# Patient Record
Sex: FEMALE | Race: WHITE | NOT HISPANIC OR LATINO
[De-identification: names, ages, dates, MRNs, and addresses within clinical notes are randomized per-mention and may not be internally consistent; named-entity substitution may affect disease eponyms.]

---

## 2022-12-01 ENCOUNTER — APPOINTMENT (OUTPATIENT)
Dept: OBGYN | Facility: CLINIC | Age: 67
End: 2022-12-01

## 2023-01-09 ENCOUNTER — APPOINTMENT (OUTPATIENT)
Dept: OBGYN | Facility: CLINIC | Age: 68
End: 2023-01-09
Payer: MEDICARE

## 2023-01-09 VITALS
DIASTOLIC BLOOD PRESSURE: 86 MMHG | BODY MASS INDEX: 32.07 KG/M2 | SYSTOLIC BLOOD PRESSURE: 145 MMHG | WEIGHT: 181 LBS | HEIGHT: 63 IN

## 2023-01-09 DIAGNOSIS — Z01.419 ENCOUNTER FOR GYNECOLOGICAL EXAMINATION (GENERAL) (ROUTINE) W/OUT ABNORMAL FINDINGS: ICD-10-CM

## 2023-01-09 DIAGNOSIS — N95.2 POSTMENOPAUSAL ATROPHIC VAGINITIS: ICD-10-CM

## 2023-01-09 PROCEDURE — G0328 FECAL BLOOD SCRN IMMUNOASSAY: CPT | Mod: QW

## 2023-01-09 PROCEDURE — G0101: CPT

## 2023-01-10 LAB — HPV HIGH+LOW RISK DNA PNL CVX: NOT DETECTED

## 2023-01-16 LAB — CYTOLOGY CVX/VAG DOC THIN PREP: ABNORMAL

## 2023-02-09 DIAGNOSIS — R92.8 OTHER ABNORMAL AND INCONCLUSIVE FINDINGS ON DIAGNOSTIC IMAGING OF BREAST: ICD-10-CM

## 2023-02-13 ENCOUNTER — NON-APPOINTMENT (OUTPATIENT)
Age: 68
End: 2023-02-13

## 2023-02-14 DIAGNOSIS — N63.0 UNSPECIFIED LUMP IN UNSPECIFIED BREAST: ICD-10-CM

## 2023-02-23 ENCOUNTER — NON-APPOINTMENT (OUTPATIENT)
Age: 68
End: 2023-02-23

## 2023-02-24 ENCOUNTER — NON-APPOINTMENT (OUTPATIENT)
Age: 68
End: 2023-02-24

## 2023-02-28 ENCOUNTER — NON-APPOINTMENT (OUTPATIENT)
Age: 68
End: 2023-02-28

## 2023-03-07 ENCOUNTER — APPOINTMENT (OUTPATIENT)
Dept: SURGICAL ONCOLOGY | Facility: CLINIC | Age: 68
End: 2023-03-07

## 2023-03-09 ENCOUNTER — OUTPATIENT (OUTPATIENT)
Dept: OUTPATIENT SERVICES | Facility: HOSPITAL | Age: 68
LOS: 1 days | End: 2023-03-09
Payer: MEDICARE

## 2023-03-09 ENCOUNTER — APPOINTMENT (OUTPATIENT)
Dept: MRI IMAGING | Facility: CLINIC | Age: 68
End: 2023-03-09
Payer: MEDICARE

## 2023-03-09 DIAGNOSIS — C50.912 MALIGNANT NEOPLASM OF UNSPECIFIED SITE OF LEFT FEMALE BREAST: ICD-10-CM

## 2023-03-09 PROCEDURE — C8908: CPT

## 2023-03-09 PROCEDURE — 77049 MRI BREAST C-+ W/CAD BI: CPT | Mod: 26

## 2023-03-09 PROCEDURE — A9585: CPT

## 2023-03-09 PROCEDURE — C8937: CPT

## 2023-03-10 ENCOUNTER — NON-APPOINTMENT (OUTPATIENT)
Age: 68
End: 2023-03-10

## 2023-03-10 ENCOUNTER — APPOINTMENT (OUTPATIENT)
Dept: SURGICAL ONCOLOGY | Facility: CLINIC | Age: 68
End: 2023-03-10
Payer: MEDICARE

## 2023-03-10 VITALS
HEIGHT: 63 IN | WEIGHT: 177 LBS | BODY MASS INDEX: 31.36 KG/M2 | RESPIRATION RATE: 15 BRPM | OXYGEN SATURATION: 96 % | SYSTOLIC BLOOD PRESSURE: 144 MMHG | HEART RATE: 75 BPM | DIASTOLIC BLOOD PRESSURE: 92 MMHG

## 2023-03-10 PROCEDURE — 99205 OFFICE O/P NEW HI 60 MIN: CPT

## 2023-03-21 ENCOUNTER — OUTPATIENT (OUTPATIENT)
Dept: OUTPATIENT SERVICES | Facility: HOSPITAL | Age: 68
LOS: 1 days | End: 2023-03-21
Payer: MEDICARE

## 2023-03-21 ENCOUNTER — RESULT REVIEW (OUTPATIENT)
Age: 68
End: 2023-03-21

## 2023-03-21 DIAGNOSIS — Z90.49 ACQUIRED ABSENCE OF OTHER SPECIFIED PARTS OF DIGESTIVE TRACT: Chronic | ICD-10-CM

## 2023-03-21 DIAGNOSIS — C80.1 MALIGNANT (PRIMARY) NEOPLASM, UNSPECIFIED: ICD-10-CM

## 2023-03-21 PROCEDURE — 88321 CONSLTJ&REPRT SLD PREP ELSWR: CPT

## 2023-03-22 LAB — SURGICAL PATHOLOGY STUDY: SIGNIFICANT CHANGE UP

## 2023-03-23 ENCOUNTER — RESULT REVIEW (OUTPATIENT)
Age: 68
End: 2023-03-23

## 2023-03-23 ENCOUNTER — APPOINTMENT (OUTPATIENT)
Dept: MAMMOGRAPHY | Facility: CLINIC | Age: 68
End: 2023-03-23
Payer: MEDICARE

## 2023-03-23 ENCOUNTER — APPOINTMENT (OUTPATIENT)
Dept: ULTRASOUND IMAGING | Facility: CLINIC | Age: 68
End: 2023-03-23
Payer: MEDICARE

## 2023-03-23 ENCOUNTER — APPOINTMENT (OUTPATIENT)
Dept: CT IMAGING | Facility: CLINIC | Age: 68
End: 2023-03-23
Payer: MEDICARE

## 2023-03-23 ENCOUNTER — OUTPATIENT (OUTPATIENT)
Dept: OUTPATIENT SERVICES | Facility: HOSPITAL | Age: 68
LOS: 1 days | End: 2023-03-23
Payer: MEDICARE

## 2023-03-23 DIAGNOSIS — R92.8 OTHER ABNORMAL AND INCONCLUSIVE FINDINGS ON DIAGNOSTIC IMAGING OF BREAST: ICD-10-CM

## 2023-03-23 DIAGNOSIS — Z90.49 ACQUIRED ABSENCE OF OTHER SPECIFIED PARTS OF DIGESTIVE TRACT: Chronic | ICD-10-CM

## 2023-03-23 PROCEDURE — 71250 CT THORAX DX C-: CPT | Mod: 26

## 2023-03-23 PROCEDURE — 76641 ULTRASOUND BREAST COMPLETE: CPT | Mod: 26,LT

## 2023-03-23 PROCEDURE — 71250 CT THORAX DX C-: CPT

## 2023-03-23 PROCEDURE — G0279: CPT | Mod: 26

## 2023-03-23 PROCEDURE — 77065 DX MAMMO INCL CAD UNI: CPT | Mod: 26,LT

## 2023-03-23 PROCEDURE — 77065 DX MAMMO INCL CAD UNI: CPT

## 2023-03-23 PROCEDURE — 76641 ULTRASOUND BREAST COMPLETE: CPT

## 2023-03-23 PROCEDURE — G0279: CPT

## 2023-03-24 ENCOUNTER — OUTPATIENT (OUTPATIENT)
Dept: OUTPATIENT SERVICES | Facility: HOSPITAL | Age: 68
LOS: 1 days | End: 2023-03-24
Payer: MEDICARE

## 2023-03-24 VITALS
RESPIRATION RATE: 15 BRPM | HEART RATE: 84 BPM | DIASTOLIC BLOOD PRESSURE: 84 MMHG | WEIGHT: 177.91 LBS | TEMPERATURE: 98 F | OXYGEN SATURATION: 99 % | HEIGHT: 60 IN | SYSTOLIC BLOOD PRESSURE: 120 MMHG

## 2023-03-24 DIAGNOSIS — C50.919 MALIGNANT NEOPLASM OF UNSPECIFIED SITE OF UNSPECIFIED FEMALE BREAST: ICD-10-CM

## 2023-03-24 DIAGNOSIS — R09.89 OTHER SPECIFIED SYMPTOMS AND SIGNS INVOLVING THE CIRCULATORY AND RESPIRATORY SYSTEMS: ICD-10-CM

## 2023-03-24 DIAGNOSIS — C50.912 MALIGNANT NEOPLASM OF UNSPECIFIED SITE OF LEFT FEMALE BREAST: ICD-10-CM

## 2023-03-24 DIAGNOSIS — Z90.49 ACQUIRED ABSENCE OF OTHER SPECIFIED PARTS OF DIGESTIVE TRACT: Chronic | ICD-10-CM

## 2023-03-24 LAB
ALBUMIN SERPL ELPH-MCNC: 4.3 G/DL — SIGNIFICANT CHANGE UP (ref 3.3–5)
ALP SERPL-CCNC: 58 U/L — SIGNIFICANT CHANGE UP (ref 40–120)
ALT FLD-CCNC: 15 U/L — SIGNIFICANT CHANGE UP (ref 4–33)
ANION GAP SERPL CALC-SCNC: 10 MMOL/L — SIGNIFICANT CHANGE UP (ref 7–14)
AST SERPL-CCNC: 18 U/L — SIGNIFICANT CHANGE UP (ref 4–32)
BILIRUB SERPL-MCNC: 0.2 MG/DL — SIGNIFICANT CHANGE UP (ref 0.2–1.2)
BUN SERPL-MCNC: 20 MG/DL — SIGNIFICANT CHANGE UP (ref 7–23)
CALCIUM SERPL-MCNC: 9.7 MG/DL — SIGNIFICANT CHANGE UP (ref 8.4–10.5)
CHLORIDE SERPL-SCNC: 105 MMOL/L — SIGNIFICANT CHANGE UP (ref 98–107)
CO2 SERPL-SCNC: 25 MMOL/L — SIGNIFICANT CHANGE UP (ref 22–31)
CREAT SERPL-MCNC: 0.9 MG/DL — SIGNIFICANT CHANGE UP (ref 0.5–1.3)
EGFR: 69 ML/MIN/1.73M2 — SIGNIFICANT CHANGE UP
GLUCOSE SERPL-MCNC: 113 MG/DL — HIGH (ref 70–99)
HCT VFR BLD CALC: 43.4 % — SIGNIFICANT CHANGE UP (ref 34.5–45)
HGB BLD-MCNC: 13.9 G/DL — SIGNIFICANT CHANGE UP (ref 11.5–15.5)
MCHC RBC-ENTMCNC: 28.2 PG — SIGNIFICANT CHANGE UP (ref 27–34)
MCHC RBC-ENTMCNC: 32 GM/DL — SIGNIFICANT CHANGE UP (ref 32–36)
MCV RBC AUTO: 88 FL — SIGNIFICANT CHANGE UP (ref 80–100)
NRBC # BLD: 0 /100 WBCS — SIGNIFICANT CHANGE UP (ref 0–0)
NRBC # FLD: 0 K/UL — SIGNIFICANT CHANGE UP (ref 0–0)
PLATELET # BLD AUTO: 202 K/UL — SIGNIFICANT CHANGE UP (ref 150–400)
POTASSIUM SERPL-MCNC: 4.5 MMOL/L — SIGNIFICANT CHANGE UP (ref 3.5–5.3)
POTASSIUM SERPL-SCNC: 4.5 MMOL/L — SIGNIFICANT CHANGE UP (ref 3.5–5.3)
PROT SERPL-MCNC: 6.9 G/DL — SIGNIFICANT CHANGE UP (ref 6–8.3)
RBC # BLD: 4.93 M/UL — SIGNIFICANT CHANGE UP (ref 3.8–5.2)
RBC # FLD: 12.3 % — SIGNIFICANT CHANGE UP (ref 10.3–14.5)
SODIUM SERPL-SCNC: 140 MMOL/L — SIGNIFICANT CHANGE UP (ref 135–145)
WBC # BLD: 6.16 K/UL — SIGNIFICANT CHANGE UP (ref 3.8–10.5)
WBC # FLD AUTO: 6.16 K/UL — SIGNIFICANT CHANGE UP (ref 3.8–10.5)

## 2023-03-24 PROCEDURE — 93010 ELECTROCARDIOGRAM REPORT: CPT

## 2023-03-24 NOTE — H&P PST ADULT - MOUTH
Follow up with your pediatrician within 48 hours of discharge.    Return to ED for worsening abdominal pain, fever, bloody urine, or inability to urinate. moist

## 2023-03-24 NOTE — H&P PST ADULT - HISTORY OF PRESENT ILLNESS
67 year old female with  PMH of Obesity, presents to [facility], with pre op diagnosis of malignant neoplasm of left female breast, for pre op evaluation prior to scheduled surgery-left breastSavi localized partial mastectomy left axillary sentinel lymph node biopsy possible tissue transfer. with Dr Reyes.

## 2023-03-24 NOTE — H&P PST ADULT - ASSESSMENT
67 year old female with no pertinent PMH, presents to [facility], with pre op diagnosis of malignant neoplasm of left female breast, for pre op evaluation prior to scheduled surgery-left breastSavi localized partial mastectomy left axillary sentinel lymph node biopsy possible tissue transfer. with Dr Reyes.

## 2023-03-24 NOTE — H&P PST ADULT - ATTENDING COMMENTS
Laterality was confirmed with the patient and marked accordingly.   No new changes since H&P.   Risks and benefits of procedure were discussed with the patient, including but not limited to bleeding, infection, altered cosmesis, numbness, and possible need for additional surgery.  Informed consent was obtained.    OK to proceed with scheduled surgery:  Left anahy-localized PM, possible tissue transfer, Left SLNB

## 2023-03-24 NOTE — H&P PST ADULT - NSANTHOSAYNRD_GEN_A_CORE
No. MANDA screening performed.  STOP BANG Legend: 0-2 = LOW Risk; 3-4 = INTERMEDIATE Risk; 5-8 = HIGH Risk

## 2023-03-30 ENCOUNTER — RESULT REVIEW (OUTPATIENT)
Age: 68
End: 2023-03-30

## 2023-03-30 ENCOUNTER — APPOINTMENT (OUTPATIENT)
Dept: ULTRASOUND IMAGING | Facility: IMAGING CENTER | Age: 68
End: 2023-03-30
Payer: MEDICARE

## 2023-03-30 ENCOUNTER — OUTPATIENT (OUTPATIENT)
Dept: OUTPATIENT SERVICES | Facility: HOSPITAL | Age: 68
LOS: 1 days | End: 2023-03-30
Payer: MEDICARE

## 2023-03-30 ENCOUNTER — TRANSCRIPTION ENCOUNTER (OUTPATIENT)
Age: 68
End: 2023-03-30

## 2023-03-30 VITALS
DIASTOLIC BLOOD PRESSURE: 86 MMHG | TEMPERATURE: 98 F | SYSTOLIC BLOOD PRESSURE: 160 MMHG | HEIGHT: 60 IN | WEIGHT: 177.91 LBS | HEART RATE: 66 BPM | OXYGEN SATURATION: 99 % | RESPIRATION RATE: 15 BRPM

## 2023-03-30 DIAGNOSIS — Z90.49 ACQUIRED ABSENCE OF OTHER SPECIFIED PARTS OF DIGESTIVE TRACT: Chronic | ICD-10-CM

## 2023-03-30 DIAGNOSIS — Z00.8 ENCOUNTER FOR OTHER GENERAL EXAMINATION: ICD-10-CM

## 2023-03-30 PROBLEM — M25.562 PAIN IN LEFT KNEE: Chronic | Status: ACTIVE | Noted: 2023-03-24

## 2023-03-30 PROCEDURE — C1739: CPT

## 2023-03-30 PROCEDURE — 19285 PERQ DEV BREAST 1ST US IMAG: CPT | Mod: LT

## 2023-03-30 PROCEDURE — 19285 PERQ DEV BREAST 1ST US IMAG: CPT

## 2023-03-31 ENCOUNTER — APPOINTMENT (OUTPATIENT)
Dept: MAMMOGRAPHY | Facility: IMAGING CENTER | Age: 68
End: 2023-03-31
Payer: MEDICARE

## 2023-03-31 ENCOUNTER — RESULT REVIEW (OUTPATIENT)
Age: 68
End: 2023-03-31

## 2023-03-31 ENCOUNTER — TRANSCRIPTION ENCOUNTER (OUTPATIENT)
Age: 68
End: 2023-03-31

## 2023-03-31 ENCOUNTER — OUTPATIENT (OUTPATIENT)
Dept: OUTPATIENT SERVICES | Facility: HOSPITAL | Age: 68
LOS: 1 days | Discharge: HOME | End: 2023-03-31
Payer: MEDICARE

## 2023-03-31 ENCOUNTER — OUTPATIENT (OUTPATIENT)
Dept: OUTPATIENT SERVICES | Facility: HOSPITAL | Age: 68
LOS: 1 days | End: 2023-03-31
Payer: COMMERCIAL

## 2023-03-31 ENCOUNTER — APPOINTMENT (OUTPATIENT)
Dept: NUCLEAR MEDICINE | Facility: IMAGING CENTER | Age: 68
End: 2023-03-31

## 2023-03-31 ENCOUNTER — APPOINTMENT (OUTPATIENT)
Dept: SURGICAL ONCOLOGY | Facility: AMBULATORY SURGERY CENTER | Age: 68
End: 2023-03-31

## 2023-03-31 VITALS
SYSTOLIC BLOOD PRESSURE: 136 MMHG | DIASTOLIC BLOOD PRESSURE: 81 MMHG | HEART RATE: 77 BPM | RESPIRATION RATE: 16 BRPM | OXYGEN SATURATION: 97 % | TEMPERATURE: 97 F

## 2023-03-31 DIAGNOSIS — Z90.49 ACQUIRED ABSENCE OF OTHER SPECIFIED PARTS OF DIGESTIVE TRACT: Chronic | ICD-10-CM

## 2023-03-31 DIAGNOSIS — C50.912 MALIGNANT NEOPLASM OF UNSPECIFIED SITE OF LEFT FEMALE BREAST: ICD-10-CM

## 2023-03-31 DIAGNOSIS — Z00.8 ENCOUNTER FOR OTHER GENERAL EXAMINATION: ICD-10-CM

## 2023-03-31 PROCEDURE — 38900 IO MAP OF SENT LYMPH NODE: CPT | Mod: LT

## 2023-03-31 PROCEDURE — 76098 X-RAY EXAM SURGICAL SPECIMEN: CPT

## 2023-03-31 PROCEDURE — 88305 TISSUE EXAM BY PATHOLOGIST: CPT | Mod: 26

## 2023-03-31 PROCEDURE — 38792 RA TRACER ID OF SENTINL NODE: CPT | Mod: 59,LT

## 2023-03-31 PROCEDURE — 76098 X-RAY EXAM SURGICAL SPECIMEN: CPT | Mod: 26

## 2023-03-31 PROCEDURE — 38525 BIOPSY/REMOVAL LYMPH NODES: CPT | Mod: LT

## 2023-03-31 PROCEDURE — 19301 PARTIAL MASTECTOMY: CPT | Mod: LT

## 2023-03-31 PROCEDURE — 88307 TISSUE EXAM BY PATHOLOGIST: CPT | Mod: 26

## 2023-03-31 DEVICE — CLIP APPLIER ETHICON LIGACLIP 11.5" MEDIUM: Type: IMPLANTABLE DEVICE | Site: LEFT BREAST | Status: FUNCTIONAL

## 2023-03-31 RX ORDER — SODIUM CHLORIDE 9 MG/ML
1000 INJECTION, SOLUTION INTRAVENOUS
Refills: 0 | Status: DISCONTINUED | OUTPATIENT
Start: 2023-03-31 | End: 2023-04-14

## 2023-03-31 NOTE — ASU DISCHARGE PLAN (ADULT/PEDIATRIC) - CARE PROVIDER_API CALL
Reyes, Sylvia A (MD)  Surgery  [address] [place] [address]  Phone: [phone]  Fax: [phone]  Established Patient  Follow Up Time: 2 weeks

## 2023-03-31 NOTE — ASU DISCHARGE PLAN (ADULT/PEDIATRIC) - NS MD DC FALL RISK RISK
For information on Fall & Injury Prevention, visit: [email or web address] OR  [email or web address] OR  https://www.cdc.gov/steadi/patient.html

## 2023-03-31 NOTE — BRIEF OPERATIVE NOTE - NSICDXBRIEFPROCEDURE_GEN_ALL_CORE_FT
PROCEDURES:  Partial mastectomy of left breast with sentinel lymph node biopsy 31-Mar-2023 09:32:40 Chio localized Ruthie Jimenes  Mapping, lymph node, sentinel, intraoperative 31-Mar-2023 09:33:15 Left axilla Ruthie Jimenes

## 2023-03-31 NOTE — BRIEF OPERATIVE NOTE - OPERATION/FINDINGS
Left breast mass at 1:00 localized with Chio  and removed, Specimen Xray reviewed with no additional margins taken, Clips where placed in cavity. Left axillary [place] lymph node # 1-2 identified (Both Blue) Left breast mass at 1:00 localized with Chio  and removed, Specimen Xray reviewed with no additional margins taken, Clips where placed in cavity. Left axillary [place] lymph node # 1-2 identified (Both Blue).  Posterior margin not to muscle.   Anterior is skin.  No additional margins taken.

## 2023-03-31 NOTE — ASU DISCHARGE PLAN (ADULT/PEDIATRIC) - CALL YOUR DOCTOR IF YOU HAVE ANY OF THE FOLLOWING:
Bleeding that does not stop/Swelling that gets worse/Pain not relieved by Medications/Fever greater than (need to indicate Fahrenheit or Celsius)/Wound/Surgical Site with redness, or foul smelling discharge or pus/Numbness, tingling, color or temperature change to extremity/Increased irritability or sluggishness Bleeding that does not stop/Swelling that gets worse/Pain not relieved by Medications/Fever greater than (need to indicate Fahrenheit or Celsius)/Wound/Surgical Site with redness, or foul smelling discharge or pus/Numbness, tingling, color or temperature change to extremity/Nausea and vomiting that does not stop/Unable to urinate/Inability to tolerate liquids or foods/Increased irritability or sluggishness

## 2023-04-04 LAB — SURGICAL PATHOLOGY STUDY: SIGNIFICANT CHANGE UP

## 2023-04-05 NOTE — PHYSICAL EXAM
[Normal] : supple, no neck mass and thyroid not enlarged [Normal Neck Lymph Nodes] : normal neck lymph nodes  [Normal Supraclavicular Lymph Nodes] : normal supraclavicular lymph nodes [Normal] : oriented to person, place and time, with appropriate affect [de-identified] : Left breast with post-biopsy changes noted in the UOQ, no discrete mass.   No palpable breast masses in either breast.   No skin changes and no nipple discharge on bilateral breast exam.  No gross clinical lymphadenopathy.   The left axilla is faintly palpable for a possible node.    [de-identified] : Normal respiratory effort [de-identified] : No gross clinical axillary lymphadenopathy.   The left axilla is faintly palpable for a possible node.

## 2023-04-05 NOTE — HISTORY OF PRESENT ILLNESS
[de-identified] : REAGAN DIAZ is a 67 year F who presents to office for newly diagnosed Left breast  cancer (1N8 0.4 cm mass) IDC, ER %, [place] %, HER2 Negative (1+) -Non-amplified \par \par Denies feeling any palpable lumps, skin changes or nipple discharge.   No systemic symptoms.   \par \par Referred by: Dr Breonna Ch (GYN) \par PCP: Dr. Chris Shi  \par \par PMHx: Denies \par PSHx: Lap CCY +15 yrs ago, Umbilical hernia + 8 yrs ago \par Meds: Denies \par NKDA\par Family Hx: No history of Breast or ovarian cancer, prostate Cancer ( Father dx 70), Colon Cancer ( son- passed at 35) \par GYN: , menarche age 12.,, menopause in early 50s,  Age at first pregnancy 20.  Y ( 6-7 months of each)\par Bra size:40 D \par

## 2023-04-05 NOTE — ASSESSMENT
[FreeTextEntry1] : REAGAN DIAZ is a 67 year F with L 1N8 0.4 cm IDC, ER/OR Positive, HER2 Negative (1+)- Pending FISH\par ~ cT1 cN0 Stage 1A \par \par We had a lengthy discussion regarding the natural history of breast cancer,her pathology results, her breast imaging, staging, surgical options and adjuvant treatment. \par Please see scanned progress noted for personalized care plan.   \par \par We discussed the sternal mass and she will undergo a CT sternum for further evaluation.   She will also obtain a left axillary ultrasound.   \par \par We discussed surgical options of lumpectomy vs mastectomy and the risks and benefits of each including but not limited to bleeding, infection, altered cosmesis, recovery time, possible need for reoperation, numbness, nerve or vessel injury, and recurrence risk.  She will proceed with breast conservation.  MRI shows no further extent of disease.  \par \par \par Tentative Surgical Plan: Left breast Chio localized partial mastectomy, Left [place] lymph node biopsy, possible tissue transfer \par Tentative Surgical Date:  TBD\par \par - f/u CT chest to evaluate sternal mass, likely hemangioma.  \par - Pre-operative planning, will need appointments for PST, COVID Testing and Chio  localization \par - Will need Medical oncology and Radiation oncology evaluation after surgery \par - RTO for post-operative visit

## 2023-04-05 NOTE — RESULTS/DATA
[FreeTextEntry1] : BREAST PATH/RAD REVIEW \par \par 6/17/2019 BL SC MG: BIRADs 2, Scattered areas of fibroglandular density \par 10/29/2020 BL SC MG: BIRADs 1\par 02/02/2023 BL SC MG: BIRADs 0, L middle UOQ focal asymmetry - Rec L Dx MG/US \par 02/13/2023 L Dx MG/US: BIRADs 4, Indeterminate L 1N8 4 mm suspicious mass - Rec USGBx if non concordant mammographically then rec Stereo bx \par 02/21/2023 L USGBx:\par ~ L 1N8 core bx: Invasive ductal carcinoma with prominent micropapillary feat. Moderately differentiated with calcs, DCIS not seen, ER %, [place] %, HER2 Negative ( 1+)-> FISH PENDING, Ki-67 25%\par \par 3/9/2023 Breast MRI: L UOQ bx marker w/ 8 mm associated NME corresp to site of newly dx malignancy, 2.5 cm Sternal lesion, likely a hemangioma - Rec further eval w/ CT Sternum for confirmation \par

## 2023-04-11 ENCOUNTER — APPOINTMENT (OUTPATIENT)
Dept: SURGICAL ONCOLOGY | Facility: CLINIC | Age: 68
End: 2023-04-11
Payer: MEDICARE

## 2023-04-11 VITALS
TEMPERATURE: 96.9 F | SYSTOLIC BLOOD PRESSURE: 155 MMHG | WEIGHT: 178 LBS | RESPIRATION RATE: 17 BRPM | HEIGHT: 63 IN | BODY MASS INDEX: 31.54 KG/M2 | DIASTOLIC BLOOD PRESSURE: 98 MMHG | HEART RATE: 103 BPM | OXYGEN SATURATION: 98 %

## 2023-04-11 PROCEDURE — 99024 POSTOP FOLLOW-UP VISIT: CPT

## 2023-04-12 ENCOUNTER — NON-APPOINTMENT (OUTPATIENT)
Age: 68
End: 2023-04-12

## 2023-04-17 ENCOUNTER — OUTPATIENT (OUTPATIENT)
Dept: OUTPATIENT SERVICES | Facility: HOSPITAL | Age: 68
LOS: 1 days | Discharge: HOME | End: 2023-04-17
Payer: MEDICARE

## 2023-04-17 DIAGNOSIS — Z90.49 ACQUIRED ABSENCE OF OTHER SPECIFIED PARTS OF DIGESTIVE TRACT: Chronic | ICD-10-CM

## 2023-04-29 ENCOUNTER — OUTPATIENT (OUTPATIENT)
Dept: OUTPATIENT SERVICES | Facility: HOSPITAL | Age: 68
LOS: 1 days | Discharge: HOME | End: 2023-04-29

## 2023-04-29 DIAGNOSIS — N64.89 OTHER SPECIFIED DISORDERS OF BREAST: ICD-10-CM

## 2023-04-29 DIAGNOSIS — Z90.49 ACQUIRED ABSENCE OF OTHER SPECIFIED PARTS OF DIGESTIVE TRACT: Chronic | ICD-10-CM

## 2023-05-01 ENCOUNTER — APPOINTMENT (OUTPATIENT)
Dept: RADIATION ONCOLOGY | Facility: CLINIC | Age: 68
End: 2023-05-01
Payer: MEDICARE

## 2023-05-01 VITALS
BODY MASS INDEX: 31.64 KG/M2 | WEIGHT: 178.57 LBS | OXYGEN SATURATION: 97 % | DIASTOLIC BLOOD PRESSURE: 94 MMHG | HEART RATE: 95 BPM | SYSTOLIC BLOOD PRESSURE: 136 MMHG | TEMPERATURE: 97.7 F | RESPIRATION RATE: 17 BRPM | HEIGHT: 63 IN

## 2023-05-01 DIAGNOSIS — Z78.9 OTHER SPECIFIED HEALTH STATUS: ICD-10-CM

## 2023-05-01 DIAGNOSIS — Z80.42 FAMILY HISTORY OF MALIGNANT NEOPLASM OF PROSTATE: ICD-10-CM

## 2023-05-01 PROCEDURE — 77263 THER RADIOLOGY TX PLNG CPLX: CPT

## 2023-05-01 PROCEDURE — 99204 OFFICE O/P NEW MOD 45 MIN: CPT | Mod: GC,25

## 2023-05-01 RX ORDER — ESTRADIOL 0.1 MG/G
0.1 CREAM VAGINAL
Qty: 1 | Refills: 4 | Status: DISCONTINUED | COMMUNITY
Start: 2023-01-09 | End: 2023-05-01

## 2023-05-01 NOTE — VITALS
[Maximal Pain Intensity: 0/10] : 0/10 [90: Able to carry normal activity; minor signs or symptoms of disease.] : 90: Able to carry normal activity; minor signs or symptoms of disease.  [6 - Distress Level] : Distress Level: 6 [Referred Patient  to social work for follow-up] : Patient was referred to social work for follow-up

## 2023-05-02 ENCOUNTER — NON-APPOINTMENT (OUTPATIENT)
Age: 68
End: 2023-05-02

## 2023-05-03 NOTE — PHYSICAL EXAM
[Sclera] : the sclera and conjunctiva were normal [PERRL With Normal Accommodation] : pupils were equal in size, round, reactive to light [] : no respiratory distress [Exaggerated Use Of Accessory Muscles For Inspiration] : no accessory muscle use [No UE Edema] : there is no upper extremity edema [Abdomen Soft] : soft [Nondistended] : nondistended [Axillary Lymph Nodes Enlarged Bilaterally] : axillary [Normal] : no focal deficits [de-identified] : Left lumpectomy and axillary scars are well healed, no erythema

## 2023-05-03 NOTE — HISTORY OF PRESENT ILLNESS
[FreeTextEntry1] : Ms. Lopez is a 68 yo female with left IDC, ER+W[place]+HER2-, s/p lumpectomy with negative lymph node biopsy. She presents for radiotherapy recommendations.\par \par She was undergoing routine screening mammography. The last screening was on 10/29/20. A screening mammogram on 2/2/23 showed a focal asymmetry was seen in the upper outer left breast, middle depth. Left diagnostic mammography on 2/13/23 showed a persistent indeterminate mass at 1:00, 8 cm FN measuring 3 mm with adjacent distortion. Targeted left breast ultrasound showed an indeterminate 4 mm mass correlating with the mammographic finding. \par \par Ultrasound guided biopsy of the left breast mass on 2/21/23 showed invasive ductal carcinoma with prominent micropapillary features, moderately differentiated, with calcifications. The tumor measured at least 3.5 mm. DCIS was not identified. IHC showed ER %, [place] % and HER 2 1+, KI-67 25%.\par \par Breast MRI on 3/9/23 showed an 8 mm enhancing mass in the upper outer left breast associated with biopsy clip. A sternal lesion consistent with a hemangioma was noted. There was no evidence of multicentric or contralateral disease. There was no axillary or internal mammary lymphadenopathy. \par \par CT chest on 3/23/23 showed the lesion in the mid sternum and unchanged compared to a prior scan from 2016, consistent with hemangioma.\par \par She underwent lumpectomy and sentinel lymph node biopsy on 3/31/23. Pathology showed an invasive moderately differentiated ductal carcinoma measuring 2.0 mm. Extensive DCIS was also present, with solid and cribriform patterns, and intermediate to high grade nuclear atypia. DCIS measured at least 3 mm and was present in 8 out of 19 blocks. One left axillary sentinel lymph node was negative for tumor. Margins were negative for invasive carcinoma and for DCIS by 1 mm. \par \par Today, she feels well with mild tenderness over the incision and operative bed. She denies bleeding, drainage, or fevers.\par

## 2023-05-08 ENCOUNTER — NON-APPOINTMENT (OUTPATIENT)
Age: 68
End: 2023-05-08

## 2023-05-08 ENCOUNTER — APPOINTMENT (OUTPATIENT)
Dept: HEMATOLOGY ONCOLOGY | Facility: CLINIC | Age: 68
End: 2023-05-08
Payer: MEDICARE

## 2023-05-08 VITALS
OXYGEN SATURATION: 97 % | SYSTOLIC BLOOD PRESSURE: 126 MMHG | WEIGHT: 176.37 LBS | TEMPERATURE: 97.2 F | HEART RATE: 96 BPM | RESPIRATION RATE: 18 BRPM | BODY MASS INDEX: 34.17 KG/M2 | DIASTOLIC BLOOD PRESSURE: 92 MMHG | HEIGHT: 60.43 IN

## 2023-05-08 DIAGNOSIS — Z80.0 FAMILY HISTORY OF MALIGNANT NEOPLASM OF DIGESTIVE ORGANS: ICD-10-CM

## 2023-05-08 PROCEDURE — 77333 RADIATION TREATMENT AID(S): CPT | Mod: 26

## 2023-05-08 PROCEDURE — 77290 THER RAD SIMULAJ FIELD CPLX: CPT | Mod: 26

## 2023-05-08 PROCEDURE — 99205 OFFICE O/P NEW HI 60 MIN: CPT

## 2023-05-08 NOTE — HISTORY OF PRESENT ILLNESS
[T: ___] : T[unfilled] [N: ___] : N[unfilled] [M: ___] : M[unfilled] [de-identified] : Patient underwent screening mammogram on 23 which showed focal asymmetry in the left breast upper outer quadrant. Diagnostic mammo/sono on 23 showed indeterminate mass at 1:00 8cmFN measuring 3mm with adjacent distortion on mammo and 4mm on sono.\par \par 23 U/S guided biopsy  - invasive ductal carcinoma with prominent micropapillary features, moderately differentiated, with calcifications. The tumor measured at least 3.5 mm. DCIS was not identified. IHC showed ER %, [place] % and HER 2 1+, KI-67 25%.\par \par 3/9/23 BREAST MRI - 8 mm enhancing mass in the upper outer left breast associated with biopsy clip. A sternal lesion consistent with a hemangioma was noted. There was no evidence of multicentric or contralateral disease. There was no axillary or internal mammary lymphadenopathy. \par \par CT chest on 3/23/23 showed the lesion in the mid sternum and unchanged compared to a prior scan from , consistent with hemangioma.\par \par 3/31/23 Under the care of Dr. Reyes she underwent  lumpectomy and sentinel lymph node biopsy. Pathology - invasive moderately differentiated ductal carcinoma measuring 2.0 mm. Extensive DCIS was also present, with solid and cribriform patterns, and intermediate to high grade nuclear atypia. DCIS measured at least 3 mm and was present in 8 out of 19 blocks. One left axillary sentinel lymph node was negative for tumor. Margins were negative for invasive carcinoma and for DCIS by 1 mm. \par \par On 23 she was seen by Dr. Marion Rice from radiation oncology with plan for radiation planning this week. 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

## 2023-05-08 NOTE — PHYSICAL EXAM
[Fully active, able to carry on all pre-disease performance without restriction] : Status 0 - Fully active, able to carry on all pre-disease performance without restriction [Normal] : affect appropriate [de-identified] : left breast upper outer periareoalar incision well healed; no scar tissue palpated; left axillary LN dissection site well healed; no palpable lesion in either breast [de-identified] : no cervical, supraclav or axillary LAD

## 2023-05-08 NOTE — ASSESSMENT
[FreeTextEntry1] : 66 yo woman with left breast invasive ductal carcinoma measuring 2mm at final pathology (T1aN0), ER+ , TN+, Her2 negative, s/p lumpectomy with Dr. Reyes; also with DCIS noted 1mm from superior margin\par \par - planned for radiation therapy with Dr. Marion Rice; awaiting radiation planning appointment\par - given her cancer was <5mm, no role for chemotherapy; no role to check Oncotype at this time since benefit of chemotherapy in lesions <5mm has not been established\par - after completing radiation therapy, will need to start endocrine therapy to reduce risk of recurrent breast cancer; \par will start with anastrazole 1mg daily about 2 weeks after completing RT\par - risk/benefits/side effects including but not limited to worsening osteoporosis, arthralgia, myalagia, LFT abnormalities and lipid abnormalities discussed\par - will order baseline bone density testing\par - reviewed labs (in [facility]) done on 3/24/23 which showed normal CBC and chem panel including LFTs\par - Patient had the opportunity to have all their questions answered to their satisfaction \par - f/u in 3 months or as needed\par

## 2023-05-08 NOTE — CONSULT LETTER
[Dear  ___] : Dear  [unfilled], [Consult Letter:] : I had the pleasure of evaluating your patient, [unfilled]. [Please see my note below.] : Please see my note below. [Consult Closing:] : Thank you very much for allowing me to participate in the care of this patient.  If you have any questions, please do not hesitate to contact me. [Sincerely,] : Sincerely, [FreeTextEntry3] : Karlie Page MD\par \par Division of Hematology/Oncology\par [facility]\par [address]\[place] [address] \par Tel: [phone]\par Fax: [phone]\par Email: [email or web address]  [DrAmrik  ___] : Dr. CLAY

## 2023-05-15 ENCOUNTER — OUTPATIENT (OUTPATIENT)
Dept: OUTPATIENT SERVICES | Facility: HOSPITAL | Age: 68
LOS: 1 days | Discharge: HOME | End: 2023-05-15

## 2023-05-15 DIAGNOSIS — N64.89 OTHER SPECIFIED DISORDERS OF BREAST: ICD-10-CM

## 2023-05-15 DIAGNOSIS — Z90.49 ACQUIRED ABSENCE OF OTHER SPECIFIED PARTS OF DIGESTIVE TRACT: Chronic | ICD-10-CM

## 2023-05-15 PROCEDURE — 77300 RADIATION THERAPY DOSE PLAN: CPT | Mod: 26

## 2023-05-15 PROCEDURE — 77295 3-D RADIOTHERAPY PLAN: CPT | Mod: 26

## 2023-05-15 PROCEDURE — 77334 RADIATION TREATMENT AID(S): CPT | Mod: 26

## 2023-05-18 PROCEDURE — 77280 THER RAD SIMULAJ FIELD SMPL: CPT | Mod: 26

## 2023-05-24 ENCOUNTER — NON-APPOINTMENT (OUTPATIENT)
Age: 68
End: 2023-05-24

## 2023-05-25 PROCEDURE — 77427 RADIATION TX MANAGEMENT X5: CPT

## 2023-05-26 NOTE — PHYSICAL EXAM
[Normal] : well developed, well nourished, in no acute distress [de-identified] : Left lumpectomy and axillary scars are well healed, no erythema. \par

## 2023-05-26 NOTE — DISEASE MANAGEMENT
[Pathological] : TNM Stage: p [IA] : IA [TTNM] : 1a [NTNM] : 0 [MTNM] : 0 [de-identified] : 1060 cGy [de-identified] : 8758 cGy [de-identified] : Left breast

## 2023-05-26 NOTE — HISTORY OF PRESENT ILLNESS
[FreeTextEntry1] : Ms. Lopez is a 68 yo female with stage IA T1aN0(sn)M0 invasive moderately differentiated ductal carcinoma of the left breast with EIC, ER %, [place] % and HER 2 1+. She underwent lumpectomy with negative margins and had a negative sentinel node biopsy. She is now receiving radiation therapy to the left breast.\par \par She is feeling generally well. She denies fatigue and pain. She has not noted any skin reactions. She is using Aquaphor on the skin. \par

## 2023-05-31 ENCOUNTER — NON-APPOINTMENT (OUTPATIENT)
Age: 68
End: 2023-05-31

## 2023-05-31 NOTE — REVIEW OF SYSTEMS
[Alopecia: Grade 0] : Alopecia: Grade 0 [Pruritus: Grade 0] : Pruritus: Grade 0 [Skin Atrophy: Grade 0] : Skin Atrophy: Grade 0 [Skin Hyperpigmentation: Grade 0] : Skin Hyperpigmentation: Grade 0 [Skin Induration: Grade 0] : Skin Induration: Grade 0 [Fatigue: Grade 0] : Fatigue: Grade 0 [Dermatitis Radiation: Grade 1 - Faint erythema or dry desquamation] : Dermatitis Radiation: Grade 1 - Faint erythema or dry desquamation

## 2023-06-01 NOTE — DISEASE MANAGEMENT
[Pathological] : TNM Stage: p [IA] : IA [TTNM] : 1a [NTNM] : 0 [MTNM] : 0 [de-identified] : 1060 cGy [de-identified] : 1604 cGy [de-identified] : Left breast

## 2023-06-01 NOTE — PHYSICAL EXAM
[Normal] : well developed, well nourished, in no acute distress [de-identified] : Left lumpectomy and axillary scars are well healed, mild erythema. \par

## 2023-06-01 NOTE — HISTORY OF PRESENT ILLNESS
[FreeTextEntry1] : Ms. Lopez is a 68 yo female with stage IA T1aN0(sn)M0 invasive moderately differentiated ductal carcinoma of the left breast with EIC, ER %, [place] % and HER 2 1+. She underwent lumpectomy with negative margins and had a negative sentinel node biopsy. She is now receiving radiation therapy to the left breast.\par \par She is feeling generally well. She denies fatigue and pain. Slight erythema and swelling to left breast.. She is using Aquaphor on the skin. \par

## 2023-06-02 PROCEDURE — 77427 RADIATION TX MANAGEMENT X5: CPT

## 2023-06-06 NOTE — REVIEW OF SYSTEMS
[Fatigue: Grade 0] : Fatigue: Grade 0 [Alopecia: Grade 0] : Alopecia: Grade 0 [Pruritus: Grade 0] : Pruritus: Grade 0 [Skin Atrophy: Grade 0] : Skin Atrophy: Grade 0 [Skin Hyperpigmentation: Grade 0] : Skin Hyperpigmentation: Grade 0 [Skin Induration: Grade 0] : Skin Induration: Grade 0 [Dermatitis Radiation: Grade 1 - Faint erythema or dry desquamation] : Dermatitis Radiation: Grade 1 - Faint erythema or dry desquamation

## 2023-06-07 ENCOUNTER — NON-APPOINTMENT (OUTPATIENT)
Age: 68
End: 2023-06-07

## 2023-06-07 DIAGNOSIS — L58.9 RADIODERMATITIS, UNSPECIFIED: ICD-10-CM

## 2023-06-09 PROCEDURE — 77427 RADIATION TX MANAGEMENT X5: CPT

## 2023-06-12 ENCOUNTER — NON-APPOINTMENT (OUTPATIENT)
Age: 68
End: 2023-06-12

## 2023-06-13 PROBLEM — L58.9 RADIATION DERMATITIS: Status: ACTIVE | Noted: 2023-06-13

## 2023-06-13 NOTE — HISTORY OF PRESENT ILLNESS
[FreeTextEntry1] : Ms. Lopez is a 66 yo female with stage IA T1aN0(sn)M0 invasive moderately differentiated ductal carcinoma of the left breast with EIC, ER %, [place] % and HER 2 1+. She underwent lumpectomy with negative margins and had a negative sentinel node biopsy. She is now receiving radiation therapy to the left breast.\par \par She is feeling generally well. She denies fatigue and pain. Slight erythema and swelling to left breast.. She is using Aquaphor on the skin.  She has a small itchy rash mid chest.\par

## 2023-06-13 NOTE — PHYSICAL EXAM
[Normal] : well developed, well nourished, in no acute distress [de-identified] : Left lumpectomy and axillary scars are well healed, mild erythema left breast and sternum region, rash between breasts and upper inner left chest\par

## 2023-06-14 NOTE — REASON FOR VISIT
[de-identified] : Left Breast Chio Localized partial mastectomy, Left Axillary [place] lymph node biopsy  [de-identified] : 3/31/2023  [de-identified] : 11

## 2023-06-14 NOTE — HISTORY OF PRESENT ILLNESS
[FreeTextEntry1] : REAGAN DIAZ is a 67 yF w Left breast cancer (1N8 0.4 cm mass) IDC, ER %, [place] %, HER2 Negative (1+) -Non-amplified. \par s/p L Chio Loc PM, L SLNBx (0) on 3/31/23 at [facility]. POD 11 \par \par recovering well post-surgery.   pain well controlled.  no fevers/chills.  no complaints. \par \par \par PMHx: Denies \par PSHx: Lap CCY +15 yrs ago, Umbilical hernia + 8 yrs ago \par Meds: Denies \par NKDA\par Family Hx: No history of Breast or ovarian cancer, prostate Cancer ( Father dx 70), Colon Cancer ( son- passed at 35) \par GYN: , menarche age 12., menopause in early 50s, Age at first pregnancy 20.  Y ( 6-7 months of each)\par Bra size:40 D

## 2023-06-14 NOTE — PHYSICAL EXAM
[Normal] : oriented to person, place and time, with appropriate affect [de-identified] : Left breast surgical incision is c/d/i, well-approximated, no hematoma/seroma, no evidence of infection, no parenchymal defect.  Left axillary incision is c/d/i.   [de-identified] : normal respiratory effort

## 2023-06-14 NOTE — DATA REVIEWED
[FreeTextEntry1] : \par BREAST PATH/RAD REVIEW \par \par 6/17/2019 BL SC MG: BIRADs 2, Scattered areas of fibroglandular density \par 10/29/2020 BL SC MG: BIRADs 1\par 02/02/2023 BL SC MG: BIRADs 0, L middle UOQ focal asymmetry - Rec L Dx MG/US \par 02/13/2023 L Dx MG/US: BIRADs 4, Indeterminate L 1N8 4 mm suspicious mass - Rec USGBx if non concordant mammographically then rec Stereo bx \par 02/21/2023 L USGBx:\par ~ L 1N8 core bx: Invasive ductal carcinoma with prominent micropapillary feat. Moderately differentiated with calcs, DCIS not seen, ER %, [place] %, HER2 Negative ( 1+)-> FISH PENDING, Ki-67 25%\par \par 3/9/2023 Breast MRI: L UOQ bx marker w/ 8 mm associated NME corresp to site of newly dx malignancy, 2.5 cm Sternal lesion, likely a hemangioma - Rec further eval w/ CT Sternum for confirmation \par \par 3/23/2023 CT Chest w/o Contrast: Abnormality on Breast MRI corresponds to a sternal abnormality that is unchanged from 12/5/2016, likely hemangioma. \par \par 3/24/2023 L DX MG/US: BIRADs 6, Normal  L Axillary fat-containing LN, L 1N8  bx- proven 5 mm cancer w/ clip in place \par \par ~3/31/2023 s/p L Chio Loc PM, L SLNBx (0/1) at [facility] ~ pT1a, pN0 \par Surgical Path: \par - L Breast 1:00 lumpectomy: Moderately differentiated IDC (2.0 mm), Focus of DCIS 1.0 mm from the superior inked resection margin. It is an isolated focus measuring 2 mm and therefore focal in mature \par \par \par

## 2023-06-14 NOTE — ASSESSMENT
[FreeTextEntry1] : REAGAN DIAZ is a 67 year F with L 1N8 0.4 cm IDC, ER/RI Positive, HER2 Negative (1+), Non amplified cT1 cN0 Stage 1A  s/p L Chio Loc PM, L SLNBx (0/1) on 3/31/23 ~ pT1a, pN0 \par \par 3/10/2023 [facility] Genetic panel: Negative, VUS - NTHL1 \par \par We discussed her surgical Pathology with confirmed Left Cancer - Moderately differentiated IDC  to be 2.0 mm and DCIS w/ isolated focus measuring 2 mm about 1 mm from the superior inked resection margin.  Invasive disease has clear margins and she will not need further surgery.  No lymph node involvement 0/1. She is healing well \par \par \par - Referred to Radiation oncology ( Dr. Rice ) for adjuvant Radiation therapy for focus of DCIS 1 mm from the superior inked resection margin \par - Referred to Medical Oncology ( Dr. Page)  for adjuvant endocrine therapy \par - RTO in 3 months

## 2023-06-19 ENCOUNTER — NON-APPOINTMENT (OUTPATIENT)
Age: 68
End: 2023-06-19

## 2023-06-20 NOTE — DISEASE MANAGEMENT
[Pathological] : TNM Stage: p [IA] : IA [TTNM] : 1a [NTNM] : 0 [MTNM] : 0 [de-identified] : 4268 cGy [de-identified] : 5276 cGy [de-identified] : Left breast

## 2023-06-20 NOTE — PHYSICAL EXAM
[Normal] : well developed, well nourished, in no acute distress [de-identified] : Left lumpectomy and axillary scars are well healed, mild erythema left breast and sternum region, rash between breasts\par

## 2023-06-20 NOTE — REVIEW OF SYSTEMS
[Alopecia: Grade 0] : Alopecia: Grade 0 [Skin Atrophy: Grade 0] : Skin Atrophy: Grade 0 [Skin Induration: Grade 0] : Skin Induration: Grade 0 [Dermatitis Radiation: Grade 1 - Faint erythema or dry desquamation] : Dermatitis Radiation: Grade 1 - Faint erythema or dry desquamation [Pruritus: Grade 1 - Mild or localized; topical intervention indicated] : Pruritus: Grade 1 - Mild or localized; topical intervention indicated [Skin Hyperpigmentation: Grade 1 - Hyperpigmentation covering <10% BSA; no psychosocial impact] : Skin Hyperpigmentation: Grade 1 - Hyperpigmentation covering <10% BSA; no psychosocial impact

## 2023-06-20 NOTE — HISTORY OF PRESENT ILLNESS
[FreeTextEntry1] : Ms. Lopez is a 66 yo female with stage IA T1aN0(sn)M0 invasive moderately differentiated ductal carcinoma of the left breast with EIC, ER %, MS % and HER 2 1+. She underwent lumpectomy with negative margins and had a negative sentinel node biopsy. She is now receiving radiation therapy to the left breast.\par \par She is feeling generally well. She denies fatigue and pain. Mild erythema and swelling to left breast. She is using Aquaphor on the skin.  She has a itchy rash mid chest, still itcy as steroid cream did not help.\par

## 2023-07-03 ENCOUNTER — APPOINTMENT (OUTPATIENT)
Dept: RADIOLOGY | Facility: IMAGING CENTER | Age: 68
End: 2023-07-03
Payer: MEDICARE

## 2023-07-03 ENCOUNTER — OUTPATIENT (OUTPATIENT)
Dept: OUTPATIENT SERVICES | Facility: HOSPITAL | Age: 68
LOS: 1 days | End: 2023-07-03
Payer: MEDICARE

## 2023-07-03 DIAGNOSIS — Z90.49 ACQUIRED ABSENCE OF OTHER SPECIFIED PARTS OF DIGESTIVE TRACT: Chronic | ICD-10-CM

## 2023-07-03 DIAGNOSIS — C50.912 MALIGNANT NEOPLASM OF UNSPECIFIED SITE OF LEFT FEMALE BREAST: ICD-10-CM

## 2023-07-03 PROCEDURE — 77080 DXA BONE DENSITY AXIAL: CPT

## 2023-07-03 PROCEDURE — 77080 DXA BONE DENSITY AXIAL: CPT | Mod: 26

## 2023-07-11 ENCOUNTER — APPOINTMENT (OUTPATIENT)
Dept: SURGICAL ONCOLOGY | Facility: CLINIC | Age: 68
End: 2023-07-11
Payer: MEDICARE

## 2023-07-11 VITALS
OXYGEN SATURATION: 96 % | WEIGHT: 178 LBS | BODY MASS INDEX: 34.95 KG/M2 | HEIGHT: 60 IN | RESPIRATION RATE: 16 BRPM | SYSTOLIC BLOOD PRESSURE: 122 MMHG | DIASTOLIC BLOOD PRESSURE: 86 MMHG | HEART RATE: 96 BPM

## 2023-07-11 PROCEDURE — 99213 OFFICE O/P EST LOW 20 MIN: CPT

## 2023-07-11 NOTE — RESULTS/DATA
[FreeTextEntry1] : \par BREAST PATH/RAD REVIEW \par \par 6/17/2019 BL SC MG: BIRADs 2, Scattered areas of fibroglandular density \par 10/29/2020 BL SC MG: BIRADs 1\par 02/02/2023 BL SC MG: BIRADs 0, L middle UOQ focal asymmetry - Rec L Dx MG/US \par 02/13/2023 L Dx MG/US: BIRADs 4, Indeterminate L 1N8 4 mm suspicious mass - Rec USGBx if non concordant mammographically then rec Stereo bx \par 02/21/2023 L USGBx:\par ~ L 1N8 core bx: Invasive ductal carcinoma with prominent micropapillary feat. Moderately differentiated with calcs, DCIS not seen, ER %, RI %, HER2 Negative ( 1+)-> FISH PENDING, Ki-67 25%\par \par 3/9/2023 Breast MRI: L UOQ bx marker w/ 8 mm associated NME corresp to site of newly dx malignancy, 2.5 cm Sternal lesion, likely a hemangioma - Rec further eval w/ CT Sternum for confirmation \par \par 3/23/2023 CT Chest w/o Contrast: Abnormality on Breast MRI corresponds to a sternal abnormality that is unchanged from 12/5/2016, likely hemangioma. \par \par 3/24/2023 L DX MG/US: BIRADs 6, Normal  L Axillary fat-containing LN, L 1N8  bx- proven 5 mm cancer w/ clip in place \par \par ~3/31/2023 s/p L Chio Loc PM, L SLNBx (0/1) at [facility] ~ pT1a, pN0 \par Surgical Path: \par - L Breast 1:00 lumpectomy: Moderately differentiated IDC (2.0 mm), Focus of DCIS 1.0 mm from the superior inked resection margin. It is an isolated focus measuring 2 mm and therefore focal in nature \par \par \par

## 2023-07-11 NOTE — PHYSICAL EXAM
[Normal] : supple, no neck mass and thyroid not enlarged [Normal Neck Lymph Nodes] : normal neck lymph nodes  [Normal Supraclavicular Lymph Nodes] : normal supraclavicular lymph nodes [Normal Axillary Lymph Nodes] : normal axillary lymph nodes [Normal] : oriented to person, place and time, with appropriate affect [de-identified] : Left breast periareolar incision is well-healed.  no parenchymal defect.  Bilateral breasts with no palpable mass.  Left breast LIQ with post-radiation changes.   Left axillary incision is well-healed.  No clinical lymphadenopathy.  [de-identified] : normal respiratory effort

## 2023-07-11 NOTE — HISTORY OF PRESENT ILLNESS
[FreeTextEntry1] : REAGAN DIAZ is a 68 y/o F w Left breast cT1 cN0  IDC, ER/IA (+) HER2 (-) HER2 Negative (-) s/p L PM, L SLNBx (0/) on 3/31/23 at [facility], s/p RT on adjuvant anastrozole who presents for 3 month follow up.  \par \par 23 Met w/ Dr. Rice for initial eval with plan for radiation therapy to the breast.  \par 23 Met w/ Dr. Page for initial eval, given cancer was <5 mm no role for chemotherapy and no role for oncotype. Plan to start adjuvant anastrozole about 2 weeks after completing RT .  \par 2023- 2023 Completed Left breast RT \par 23 - No new breast complaints.  Left post-RT skin changes are improving.  She started anastrazole 23.   \par \par \par No  PMHx, PSHx: Lap CCY +15 yrs ago, Umbilical hernia + 8 yrs ago \par NKDA\par Family Hx: No history of Breast or ovarian cancer, prostate Cancer ( Father dx 70), Colon Cancer ( son- passed at 35) \par GYN: , menarche age 12., menopause in early 50s, Age at first pregnancy 20.  Y ( 6-7 months of each)\par Bra size:40 D

## 2023-07-11 NOTE — ASSESSMENT
[FreeTextEntry1] : REAGAN DIAZ is a 67 year F with L 1N8 0.4 cm IDC ++-, cT1 cN0 Stage 1A  s/p L Chio Loc PM, L SLNBx (0/1) on 3/31/23 ~ pT1a, pN0. Path w/ 2mm isolated focus of DCIS 1 mm from superior margin.  s/p RT to left breast on adjuvant anastrozole \par 3/10/2023 [facility] Genetic (full) panel: Negative, VUS - NTHL1 \par \par Clinical breast exam is unremarkable. \par - Radiation oncology ( Dr. Rice ): Completed RT on 6/12/23 \par - Medical Oncology ( Dr. Page): started adjuvant anastrozole on 7/1\par - Next imaging: L Dx MG/US September 2023, B/L Dx MG/US due February 2024 \par RTO in 3 months. She knows to return sooner if any breast abnormalities or if any breast issues/concerns arise\par

## 2023-07-27 ENCOUNTER — APPOINTMENT (OUTPATIENT)
Dept: RADIATION ONCOLOGY | Facility: CLINIC | Age: 68
End: 2023-07-27
Payer: MEDICARE

## 2023-07-27 VITALS
DIASTOLIC BLOOD PRESSURE: 86 MMHG | OXYGEN SATURATION: 97 % | SYSTOLIC BLOOD PRESSURE: 119 MMHG | HEIGHT: 60 IN | HEART RATE: 83 BPM | WEIGHT: 179.46 LBS | TEMPERATURE: 97.16 F | BODY MASS INDEX: 35.23 KG/M2

## 2023-07-27 PROCEDURE — 99024 POSTOP FOLLOW-UP VISIT: CPT

## 2023-07-27 RX ORDER — MAGNESIUM OXIDE/MAG AA CHELATE 300 MG
CAPSULE ORAL
Refills: 0 | Status: ACTIVE | COMMUNITY

## 2023-07-27 RX ORDER — CHROMIUM 200 MCG
TABLET ORAL
Refills: 0 | Status: ACTIVE | COMMUNITY

## 2023-07-27 NOTE — VITALS
[90: Able to carry normal activity; minor signs or symptoms of disease.] : 90: Able to carry normal activity; minor signs or symptoms of disease.  [ECOG Performance Status: 0 - Fully active, able to carry on all pre-disease performance without restriction] : Performance Status: 0 - Fully active, able to carry on all pre-disease performance without restriction [Maximal Pain Intensity: 0/10] : 0/10

## 2023-07-28 NOTE — PHYSICAL EXAM
[Sclera] : the sclera and conjunctiva were normal [] : no respiratory distress [Abdomen Soft] : soft [Nondistended] : nondistended [Supraclavicular Lymph Nodes Enlarged Bilaterally] : supraclavicular [Axillary Lymph Nodes Enlarged Bilaterally] : axillary [Normal] : oriented to person, place and time, the affect was normal, the mood was normal and not anxious [de-identified] : Left lumpectomy and axillary scars are well healed, no erythema, mild residual hyperpigmentation.

## 2023-07-28 NOTE — REVIEW OF SYSTEMS
[Negative] : Allergic/Immunologic [Fatigue: Grade 0] : Fatigue: Grade 0 [Skin Hyperpigmentation: Grade 1 - Hyperpigmentation covering <10% BSA; no psychosocial impact] : Skin Hyperpigmentation: Grade 1 - Hyperpigmentation covering <10% BSA; no psychosocial impact [Alopecia: Grade 0] : Alopecia: Grade 0 [Pruritus: Grade 0] : Pruritus: Grade 0 [Skin Atrophy: Grade 0] : Skin Atrophy: Grade 0 [Skin Induration: Grade 0] : Skin Induration: Grade 0 [Dermatitis Radiation: Grade 0] : Dermatitis Radiation: Grade 0

## 2023-07-28 NOTE — HISTORY OF PRESENT ILLNESS
[FreeTextEntry1] : Ms. Lopez is a 66 yo female with stage IA T1aN0(sn)M0 invasive moderately differentiated ductal carcinoma of the left breast with EIC, ER %, DC % and HER 2 1+. She underwent lumpectomy with negative margins and had a negative sentinel node biopsy. She completed a course of adjuvant radiation therapy to the left breast, a dose of 4,240 cGy from 5/19/23 - 6/12/23.\par \par She comes today for a post-treatment evaluation. Reports intermittent tingling/pain in the left pain, not requiring any pain medications. The patient reports feeling generally well.  She is involved in her usual activities.  She has a good appetite and denies dysphagia. She denies cough or shortness of breath. The skin reaction was painful for about 3-4 weeks after treatment. There has been some improvement in the skin during the last 1-2 weeks, no longer painful.  She follows Dr. Page started Anastrazole, which she is tolerating well.\par \par

## 2023-08-08 ENCOUNTER — RX RENEWAL (OUTPATIENT)
Age: 68
End: 2023-08-08

## 2023-08-08 RX ORDER — MOMETASONE FUROATE 1 MG/G
0.1 CREAM TOPICAL TWICE DAILY
Qty: 45 | Refills: 0 | Status: ACTIVE | COMMUNITY
Start: 2023-06-13 | End: 1900-01-01

## 2023-09-07 ENCOUNTER — OUTPATIENT (OUTPATIENT)
Dept: OUTPATIENT SERVICES | Facility: HOSPITAL | Age: 68
LOS: 1 days | Discharge: HOME | End: 2023-09-07

## 2023-09-07 DIAGNOSIS — Z90.49 ACQUIRED ABSENCE OF OTHER SPECIFIED PARTS OF DIGESTIVE TRACT: Chronic | ICD-10-CM

## 2023-09-07 DIAGNOSIS — N64.89 OTHER SPECIFIED DISORDERS OF BREAST: ICD-10-CM

## 2023-09-08 ENCOUNTER — RESULT REVIEW (OUTPATIENT)
Age: 68
End: 2023-09-08

## 2023-09-08 ENCOUNTER — APPOINTMENT (OUTPATIENT)
Dept: HEMATOLOGY ONCOLOGY | Facility: CLINIC | Age: 68
End: 2023-09-08
Payer: MEDICARE

## 2023-09-08 VITALS
DIASTOLIC BLOOD PRESSURE: 86 MMHG | TEMPERATURE: 97.3 F | SYSTOLIC BLOOD PRESSURE: 128 MMHG | OXYGEN SATURATION: 97 % | WEIGHT: 180.11 LBS | RESPIRATION RATE: 16 BRPM | BODY MASS INDEX: 35.18 KG/M2 | HEART RATE: 80 BPM

## 2023-09-08 DIAGNOSIS — Z79.899 OTHER LONG TERM (CURRENT) DRUG THERAPY: ICD-10-CM

## 2023-09-08 DIAGNOSIS — Z78.0 OTHER SPECIFIED DISORDERS OF BONE DENSITY AND STRUCTURE, UNSPECIFIED SITE: ICD-10-CM

## 2023-09-08 DIAGNOSIS — M85.80 OTHER SPECIFIED DISORDERS OF BONE DENSITY AND STRUCTURE, UNSPECIFIED SITE: ICD-10-CM

## 2023-09-08 LAB
ALBUMIN SERPL ELPH-MCNC: 4.1 G/DL
ALP BLD-CCNC: 62 U/L
ALT SERPL-CCNC: 15 U/L
ANION GAP SERPL CALC-SCNC: 11 MMOL/L
AST SERPL-CCNC: 17 U/L
BASOPHILS # BLD AUTO: 0.03 K/UL — SIGNIFICANT CHANGE UP (ref 0–0.2)
BASOPHILS NFR BLD AUTO: 0.5 % — SIGNIFICANT CHANGE UP (ref 0–2)
BILIRUB SERPL-MCNC: 0.3 MG/DL
BUN SERPL-MCNC: 24 MG/DL
CALCIUM SERPL-MCNC: 9.6 MG/DL
CHLORIDE SERPL-SCNC: 105 MMOL/L
CO2 SERPL-SCNC: 25 MMOL/L
CREAT SERPL-MCNC: 0.86 MG/DL
EGFR: 73 ML/MIN/1.73M2
EOSINOPHIL # BLD AUTO: 0.06 K/UL — SIGNIFICANT CHANGE UP (ref 0–0.5)
EOSINOPHIL NFR BLD AUTO: 0.9 % — SIGNIFICANT CHANGE UP (ref 0–6)
GLUCOSE SERPL-MCNC: 103 MG/DL
HCT VFR BLD CALC: 41.8 % — SIGNIFICANT CHANGE UP (ref 34.5–45)
HGB BLD-MCNC: 13.8 G/DL — SIGNIFICANT CHANGE UP (ref 11.5–15.5)
IMM GRANULOCYTES NFR BLD AUTO: 0.3 % — SIGNIFICANT CHANGE UP (ref 0–0.9)
LYMPHOCYTES # BLD AUTO: 1.61 K/UL — SIGNIFICANT CHANGE UP (ref 1–3.3)
LYMPHOCYTES # BLD AUTO: 24.9 % — SIGNIFICANT CHANGE UP (ref 13–44)
MCHC RBC-ENTMCNC: 29.1 PG — SIGNIFICANT CHANGE UP (ref 27–34)
MCHC RBC-ENTMCNC: 33 G/DL — SIGNIFICANT CHANGE UP (ref 32–36)
MCV RBC AUTO: 88.2 FL — SIGNIFICANT CHANGE UP (ref 80–100)
MONOCYTES # BLD AUTO: 0.55 K/UL — SIGNIFICANT CHANGE UP (ref 0–0.9)
MONOCYTES NFR BLD AUTO: 8.5 % — SIGNIFICANT CHANGE UP (ref 2–14)
NEUTROPHILS # BLD AUTO: 4.19 K/UL — SIGNIFICANT CHANGE UP (ref 1.8–7.4)
NEUTROPHILS NFR BLD AUTO: 64.9 % — SIGNIFICANT CHANGE UP (ref 43–77)
NRBC # BLD: 0 /100 WBCS — SIGNIFICANT CHANGE UP (ref 0–0)
PLATELET # BLD AUTO: 188 K/UL — SIGNIFICANT CHANGE UP (ref 150–400)
POTASSIUM SERPL-SCNC: 5.1 MMOL/L
PROT SERPL-MCNC: 7 G/DL
RBC # BLD: 4.74 M/UL — SIGNIFICANT CHANGE UP (ref 3.8–5.2)
RBC # FLD: 12.4 % — SIGNIFICANT CHANGE UP (ref 10.3–14.5)
SODIUM SERPL-SCNC: 141 MMOL/L
WBC # BLD: 6.46 K/UL — SIGNIFICANT CHANGE UP (ref 3.8–10.5)
WBC # FLD AUTO: 6.46 K/UL — SIGNIFICANT CHANGE UP (ref 3.8–10.5)

## 2023-09-08 PROCEDURE — 99214 OFFICE O/P EST MOD 30 MIN: CPT

## 2023-09-08 NOTE — CONSULT LETTER
[Dear  ___] : Dear  [unfilled], [Consult Letter:] : I had the pleasure of evaluating your patient, [unfilled]. [Please see my note below.] : Please see my note below. [Consult Closing:] : Thank you very much for allowing me to participate in the care of this patient.  If you have any questions, please do not hesitate to contact me. [Sincerely,] : Sincerely, [DrAmrik  ___] : Dr. CLAY [FreeTextEntry3] : Karlie Page MD\par  \par  Division of Hematology/Oncology\par  [facility]\par  [address]\[place] [address] \par  Tel: [phone]\par  Fax: [phone]\par  Email: [email or web address]

## 2023-09-08 NOTE — ASSESSMENT
[FreeTextEntry1] : 68 yo woman with left breast invasive ductal carcinoma measuring 2mm at final pathology (T1aN0), ER+ , DE+, Her2 negative, s/p lumpectomy with Dr. Reyes on 3/31/2023 also with DCIS noted 1mm from superior margin;  completed RT 6/2023;  - given her cancer was <5mm, no role for chemotherapy; no role to check Oncotype at this time since benefit of chemotherapy in lesions <5mm has not been established  - KIMBERLY x 6 months - started anastrozole 1 mg 7/1/2023 - continue current therapy; tolerating treatment well - bw today - L. diagnostic mammo/Us due per Dr. Reyes orders - patient reminded - Annual breast imaging due 2/2024  Osteopenia - T -1.9 - recommend weight bearing exercises / Calcium/Vitamin D - repeat in 2 years  Candida - Rx Nystatin Powder   - Patient had the opportunity to have all their questions answered to their satisfaction   - f/u in 4 months or as needed

## 2023-09-08 NOTE — HISTORY OF PRESENT ILLNESS
[T: ___] : T[unfilled] [N: ___] : N[unfilled] [M: ___] : M[unfilled] [100: Normal, no complaints, no evidence of disease.] : 100: Normal, no complaints, no evidence of disease. [de-identified] : Patient underwent screening mammogram on 23 which showed focal asymmetry in the left breast upper outer quadrant. Diagnostic mammo/sono on 23 showed indeterminate mass at 1:00 8cmFN measuring 3mm with adjacent distortion on mammo and 4mm on sono.  23 U/S guided biopsy  - invasive ductal carcinoma with prominent micropapillary features, moderately differentiated, with calcifications. The tumor measured at least 3.5 mm. DCIS was not identified. IHC showed ER %, OR % and HER 2 1+, KI-67 25%.  3/9/23 BREAST MRI - 8 mm enhancing mass in the upper outer left breast associated with biopsy clip. A sternal lesion consistent with a hemangioma was noted. There was no evidence of multicentric or contralateral disease. There was no axillary or internal mammary lymphadenopathy.   CT chest on 3/23/23 showed the lesion in the mid sternum and unchanged compared to a prior scan from 2016, consistent with hemangioma.  3/31/23 Under the care of Dr. Reyes she underwent  lumpectomy and sentinel lymph node biopsy. Pathology - invasive moderately differentiated ductal carcinoma measuring 2.0 mm. Extensive DCIS was also present, with solid and cribriform patterns, and intermediate to high grade nuclear atypia. DCIS measured at least 3 mm and was present in 8 out of 19 blocks. One left axillary sentinel lymph node was negative for tumor. Margins were negative for invasive carcinoma and for DCIS by 1 mm.   On 23 she was seen by Dr. Marion Rice from radiation oncology with plan for radiation planning; Completed RT to breast 2023  Start anastrozole on 2023  RISK - [employer] genetic testing heterozygous NTHL1 VUS; , menarche at 12, menopause at 50, no HRT, no OCPs, 1st pregnancy at 20; family history significant for son who had colon cancer at 35 and passed away at 40 years of age [de-identified] : ER+HI+Her2 negative [de-identified] : .9/8/2023 REAGAN  presents today to assess for evidence of breast cancer recurrence and assess treatment toxicity. Started anastrozole 7/1/2023 REAGAN  has not experienced any significant hotflashes, arthralgias, vaginal dryness, vaginal bleeding, hair loss, muscle cramps.  Notes increased sweating - manageable REAGAN  denies any breast masses, breast tenderness, skin changes or nipple discharge. She denies any SOB, CP, abdominal pain, bone pain, headache, or unexplained weight loss Seen by Dr. Reyes in July 2023 Completed RT in June 2023  Imaging: BMD 7/2023 - Osteopenia T -1.9

## 2023-09-08 NOTE — PHYSICAL EXAM
[Fully active, able to carry on all pre-disease performance without restriction] : Status 0 - Fully active, able to carry on all pre-disease performance without restriction [Normal] : affect appropriate [de-identified] : left breast upper outer periareoalar incision well healed; no scar tissue palpated; left axillary LN dissection site well healed; no palpable lesion in either breast; between breasts and inframammary fold of right breast - erythematous papular rash  [de-identified] : no cervical, supraclav or axillary LAD

## 2023-09-26 ENCOUNTER — APPOINTMENT (OUTPATIENT)
Dept: ULTRASOUND IMAGING | Facility: IMAGING CENTER | Age: 68
End: 2023-09-26
Payer: MEDICARE

## 2023-09-26 ENCOUNTER — OUTPATIENT (OUTPATIENT)
Dept: OUTPATIENT SERVICES | Facility: HOSPITAL | Age: 68
LOS: 1 days | End: 2023-09-26
Payer: MEDICARE

## 2023-09-26 ENCOUNTER — APPOINTMENT (OUTPATIENT)
Dept: MAMMOGRAPHY | Facility: IMAGING CENTER | Age: 68
End: 2023-09-26
Payer: MEDICARE

## 2023-09-26 ENCOUNTER — RESULT REVIEW (OUTPATIENT)
Age: 68
End: 2023-09-26

## 2023-09-26 DIAGNOSIS — Z90.49 ACQUIRED ABSENCE OF OTHER SPECIFIED PARTS OF DIGESTIVE TRACT: Chronic | ICD-10-CM

## 2023-09-26 DIAGNOSIS — Z85.3 PERSONAL HISTORY OF MALIGNANT NEOPLASM OF BREAST: ICD-10-CM

## 2023-09-26 PROCEDURE — G0279: CPT

## 2023-09-26 PROCEDURE — 76642 ULTRASOUND BREAST LIMITED: CPT | Mod: 26,LT

## 2023-09-26 PROCEDURE — 77065 DX MAMMO INCL CAD UNI: CPT | Mod: 26,LT

## 2023-09-26 PROCEDURE — G0279: CPT | Mod: 26

## 2023-09-26 PROCEDURE — 76642 ULTRASOUND BREAST LIMITED: CPT

## 2023-09-26 PROCEDURE — 77065 DX MAMMO INCL CAD UNI: CPT

## 2023-10-26 ENCOUNTER — APPOINTMENT (OUTPATIENT)
Dept: SURGICAL ONCOLOGY | Facility: CLINIC | Age: 68
End: 2023-10-26
Payer: MEDICARE

## 2023-10-26 VITALS
BODY MASS INDEX: 35.34 KG/M2 | OXYGEN SATURATION: 96 % | WEIGHT: 180 LBS | SYSTOLIC BLOOD PRESSURE: 149 MMHG | HEIGHT: 60 IN | DIASTOLIC BLOOD PRESSURE: 94 MMHG | HEART RATE: 71 BPM | RESPIRATION RATE: 16 BRPM

## 2023-10-26 PROCEDURE — 99213 OFFICE O/P EST LOW 20 MIN: CPT

## 2023-12-19 ENCOUNTER — OUTPATIENT (OUTPATIENT)
Dept: OUTPATIENT SERVICES | Facility: HOSPITAL | Age: 68
LOS: 1 days | Discharge: HOME | End: 2023-12-19

## 2023-12-19 DIAGNOSIS — Z90.49 ACQUIRED ABSENCE OF OTHER SPECIFIED PARTS OF DIGESTIVE TRACT: Chronic | ICD-10-CM

## 2023-12-19 DIAGNOSIS — N64.89 OTHER SPECIFIED DISORDERS OF BREAST: ICD-10-CM

## 2024-01-02 ENCOUNTER — APPOINTMENT (OUTPATIENT)
Dept: HEMATOLOGY ONCOLOGY | Facility: CLINIC | Age: 69
End: 2024-01-02
Payer: MEDICARE

## 2024-01-02 VITALS
WEIGHT: 178.55 LBS | SYSTOLIC BLOOD PRESSURE: 154 MMHG | RESPIRATION RATE: 16 BRPM | HEART RATE: 78 BPM | DIASTOLIC BLOOD PRESSURE: 94 MMHG | TEMPERATURE: 98.1 F | BODY MASS INDEX: 34.87 KG/M2 | OXYGEN SATURATION: 97 %

## 2024-01-02 PROCEDURE — 99214 OFFICE O/P EST MOD 30 MIN: CPT

## 2024-01-02 NOTE — REVIEW OF SYSTEMS
[Diarrhea: Grade 0] : Diarrhea: Grade 0 [Negative] : Allergic/Immunologic [Joint Pain] : joint pain [Joint Stiffness] : joint stiffness [FreeTextEntry2] : + sweats after starting anastrozole

## 2024-01-02 NOTE — ASSESSMENT
[FreeTextEntry1] : 69 yo woman with left breast invasive ductal carcinoma measuring 2mm at final pathology (T1aN0), ER+ , AR+, Her2 negative, s/p lumpectomy with Dr. Reyes on 3/31/2023 also with DCIS noted 1mm from superior margin; completed RT 6/2023; - given her cancer was <5mm, no role for chemotherapy; no role to check Oncotype at this time since benefit of chemotherapy in lesions <5mm has not been established  - KIMBERLY x 10  months - started anastrozole 1 mg 7/1/2023 - continue current therapy; tolerating treatment well with plan to continue for 5-7 years; script refilled with 90 day supply - L. diagnostic mammo/Us 9/2023 BIRADS 2 - Annual breast imaging due 2/2024; patient aware and will schedule  Osteopenia -BMD 7/2023 T -1.9 - recommend weight bearing exercises / Calcium/Vitamin D - repeat in 2 years  - Patient had the opportunity to have all their questions answered to their satisfaction - labs from 9/2023 reviewed and stable - f/u in 6 months or sooner if needed

## 2024-01-02 NOTE — HISTORY OF PRESENT ILLNESS
[T: ___] : T[unfilled] [N: ___] : N[unfilled] [M: ___] : M[unfilled] [100: Normal, no complaints, no evidence of disease.] : 100: Normal, no complaints, no evidence of disease. [de-identified] : Patient underwent screening mammogram on 23 which showed focal asymmetry in the left breast upper outer quadrant. Diagnostic mammo/sono on 23 showed indeterminate mass at 1:00 8cmFN measuring 3mm with adjacent distortion on mammo and 4mm on sono.  23 U/S guided biopsy  - invasive ductal carcinoma with prominent micropapillary features, moderately differentiated, with calcifications. The tumor measured at least 3.5 mm. DCIS was not identified. IHC showed ER %, GA % and HER 2 1+, KI-67 25%.  3/9/23 BREAST MRI - 8 mm enhancing mass in the upper outer left breast associated with biopsy clip. A sternal lesion consistent with a hemangioma was noted. There was no evidence of multicentric or contralateral disease. There was no axillary or internal mammary lymphadenopathy.   CT chest on 3/23/23 showed the lesion in the mid sternum and unchanged compared to a prior scan from 2016, consistent with hemangioma.  3/31/23 Under the care of Dr. Reyes she underwent  lumpectomy and sentinel lymph node biopsy. Pathology - invasive moderately differentiated ductal carcinoma measuring 2.0 mm. Extensive DCIS was also present, with solid and cribriform patterns, and intermediate to high grade nuclear atypia. DCIS measured at least 3 mm and was present in 8 out of 19 blocks. One left axillary sentinel lymph node was negative for tumor. Margins were negative for invasive carcinoma and for DCIS by 1 mm.   On 23 she was seen by Dr. Marion Rice from radiation oncology with plan for radiation planning; Completed RT to breast 2023  Started anastrozole on 2023  RISK - [employer] genetic testing heterozygous NTHL1 VUS; , menarche at 12, menopause at 50, no HRT, no OCPs, 1st pregnancy at 20; family history significant for son who had colon cancer at 35 and passed away at 40 years of age [de-identified] : ER+MS+Her2 negative [de-identified] : 1/2/2024 Patient returns today to rule out progression or recurrence of breast cancer and to assess treatment toxicity. Started anastrozole 7/1/2023 - having increased arthralgia in hands and feet mostly; improves with stretching - having sweats (no hot flashes), but reports tolerable  Patient denies any SOB, CP, abdominal pain, bone pain, headache, or unexplained weight loss Patient denies any breast masses,  skin changes or nipple discharge. Patient denies any hotflashes, vaginal dryness, vaginal bleeding, hair loss, muscle cramps. Seen by Dr. Reyes in October 2023 Completed RT in June 2023  Imaging: BMD 7/2023 - Osteopenia T -1.9 Left Breast diagnostic mammo - 9/26/23 - BIRADS2

## 2024-01-02 NOTE — PHYSICAL EXAM
[Fully active, able to carry on all pre-disease performance without restriction] : Status 0 - Fully active, able to carry on all pre-disease performance without restriction [Normal] : affect appropriate [de-identified] : left breast upper outer periareoalar incision well healed; no scar tissue palpated; left inner lower quadrant radiation skin changes with skin thickening; left axillary LN dissection site well healed; no palpable lesion in either breast;  [de-identified] : no cervical, supraclav or axillary LAD

## 2024-01-29 ENCOUNTER — APPOINTMENT (OUTPATIENT)
Dept: RADIATION ONCOLOGY | Facility: CLINIC | Age: 69
End: 2024-01-29

## 2024-02-09 ENCOUNTER — APPOINTMENT (OUTPATIENT)
Dept: MAMMOGRAPHY | Facility: IMAGING CENTER | Age: 69
End: 2024-02-09
Payer: MEDICARE

## 2024-02-09 ENCOUNTER — RESULT REVIEW (OUTPATIENT)
Age: 69
End: 2024-02-09

## 2024-02-09 ENCOUNTER — APPOINTMENT (OUTPATIENT)
Dept: ULTRASOUND IMAGING | Facility: IMAGING CENTER | Age: 69
End: 2024-02-09
Payer: MEDICARE

## 2024-02-09 ENCOUNTER — OUTPATIENT (OUTPATIENT)
Dept: OUTPATIENT SERVICES | Facility: HOSPITAL | Age: 69
LOS: 1 days | End: 2024-02-09
Payer: MEDICARE

## 2024-02-09 DIAGNOSIS — Z85.3 PERSONAL HISTORY OF MALIGNANT NEOPLASM OF BREAST: ICD-10-CM

## 2024-02-09 DIAGNOSIS — Z90.49 ACQUIRED ABSENCE OF OTHER SPECIFIED PARTS OF DIGESTIVE TRACT: Chronic | ICD-10-CM

## 2024-02-09 PROCEDURE — 77066 DX MAMMO INCL CAD BI: CPT | Mod: 26

## 2024-02-09 PROCEDURE — 76641 ULTRASOUND BREAST COMPLETE: CPT | Mod: 26,50

## 2024-02-09 PROCEDURE — G0279: CPT

## 2024-02-09 PROCEDURE — G0279: CPT | Mod: 26

## 2024-02-09 PROCEDURE — 77066 DX MAMMO INCL CAD BI: CPT

## 2024-02-09 PROCEDURE — 76641 ULTRASOUND BREAST COMPLETE: CPT

## 2024-03-05 ENCOUNTER — APPOINTMENT (OUTPATIENT)
Dept: SURGICAL ONCOLOGY | Facility: CLINIC | Age: 69
End: 2024-03-05
Payer: MEDICARE

## 2024-03-05 VITALS
OXYGEN SATURATION: 99 % | BODY MASS INDEX: 34.95 KG/M2 | HEART RATE: 80 BPM | HEIGHT: 60 IN | DIASTOLIC BLOOD PRESSURE: 90 MMHG | WEIGHT: 178 LBS | SYSTOLIC BLOOD PRESSURE: 140 MMHG

## 2024-03-05 DIAGNOSIS — Z85.3 PERSONAL HISTORY OF MALIGNANT NEOPLASM OF BREAST: ICD-10-CM

## 2024-03-05 PROCEDURE — 99213 OFFICE O/P EST LOW 20 MIN: CPT

## 2024-05-06 PROBLEM — Z85.3 HISTORY OF LEFT BREAST CANCER: Status: ACTIVE | Noted: 2023-07-10

## 2024-05-06 NOTE — HISTORY OF PRESENT ILLNESS
[de-identified] : REAGAN DIAZ is a 68- y/o F with dx'ed w/ Left cT1 cN0 Stage 1A IDC +/+/- s/p L PM, L SLNBx (0) on 3/31/23. pT1a, pN0. [2mm isolated focus of DCIS that is 1 mm from superior margin]. s/p XRT, on adjuvant anastrozole Here for 3-month follow up.   23 Met w/ Dr. Rice for initial eval with plan for radiation therapy to the breast. 23 Met w/ Dr. Page for initial eval, given cancer was <5 mm no role for chemotherapy and no role for oncotype. Plan to start adjuvant anastrozole about 2 weeks after completing RT . 2023- 2023 Completed Left breast RT 23 - No new breast complaints. Left post-RT skin changes are improving. She started anastrozole 23.  10/26/23 - No new breast complaints.  Left Dx MG/US BR2. 3/5/24 Since last visit has undergone BL Dx MG/US rated BR 2. No new beast complaints. Tolerating anastrozole w/ some noted increase in axillary sweating, though mild.   No systemic symptoms.   Referred by: Dr Breonna Ch (GYN)  PCP: Dr. Chris Shi   PMHx: Denies  PSHx: Lap CCY +15 yrs ago, Umbilical hernia + 8 yrs ago  Meds: Denies  NKDA Family Hx: No history of Breast or ovarian cancer, prostate Cancer (Father dx 70), Colon Cancer (son- passed at 35)  GYN: , menarche age 12, menopause in early 50s, Age at first pregnancy 20.  Y (6-7 months of each) Bra size:40 D

## 2024-05-06 NOTE — ASSESSMENT
[FreeTextEntry1] : REAGAN DIAZ is a 68- y/o F with dx'ed w/ Left cT1 cN0 Stage 1A IDC +/+/- s/p L PM, L SLNBx (0/1) on 3/31/23. pT1a, pN0. [2mm isolated focus of DCIS that is 1 mm from superior margin]. s/p XRT, on adjuvant anastrozole Here for 6-month follow up.  3/10/2023 [facility] Genetic (full) panel: Negative, VUS - NTHL1  Recent breast imaging reviewed, BR 2. Clinical breast exam is unremarkable.  She has recovered well from surgery.    - Radiation oncology (Dr. Rice ): Completed RT on 6/12/23 - Medical Oncology (Dr. Page): started anastrozole on 7/1/23.  - Next imaging: B/L Dx MG/US due February 2025 - RTO for 6-month follow up. She knows to return sooner if any breast imaging abnormalities or if any breast issues/concerns arise.  .

## 2024-05-06 NOTE — RESULTS/DATA
[FreeTextEntry1] : Breast Rad/Path Summary:  02/13/2023 L Dx MG/US: BIRADs 4, Indeterminate L 1N8 4 mm suspicious mass - Rec USGBx if non concordant mammographically then rec Stereo bx 02/21/2023 L USGBx: ~ L 1N8 core bx: Invasive ductal carcinoma with prominent micropapillary feat. Moderately differentiated with calcs, DCIS not seen, ER %, OR %, HER2 Negative ( 1+)-> FISH Neg, Ki-67 25%  3/9/2023 Breast MRI: L UOQ bx marker w/ 8 mm associated NME corresp to site of newly dx malignancy, 2.5 cm Sternal lesion, likely a hemangioma - Rec further eval w/ CT Sternum for confirmation 3/23/2023 CT Chest w/o Contrast: Abnormality on Breast MRI corresponds to a sternal abnormality that is unchanged from 12/5/2016, likely hemangioma. 3/24/2023 L DX MG/US: BIRADs 6, Normal L Axillary fat-containing LN, L 1N8 bx- proven 5 mm cancer w/ clip in place  ~3/31/2023 s/p L Chio Loc PM, L SLNBx (0/1) at [facility] ~ pT1a, pN0   Surgical Path: - L Breast 1:00 lumpectomy: Moderately differentiated IDC (2.0 mm), Focus of DCIS 1.0 mm from the superior inked resection margin. It is an isolated focus measuring 2 mm   9/26/23 L Dx MG/US:  BIRADs 2. HD. postsurgical changes as expected.  2/9/24 BL Dx MG/US: BIRADs 2. SFGD. Minimal L skin thickening likely 2/2 post radiation and/or post-surgical change. Stable L 1N8 and 1:00 RA scar sites

## 2024-05-06 NOTE — PHYSICAL EXAM
[Normal] : supple, no neck mass and thyroid not enlarged [Normal Neck Lymph Nodes] : normal neck lymph nodes  [Normal Axillary Lymph Nodes] : normal axillary lymph nodes [Normal Supraclavicular Lymph Nodes] : normal supraclavicular lymph nodes [Normal] : oriented to person, place and time, with appropriate affect [de-identified] : Left breast periareolar incision is well-healed.  no parenchymal defect.  Bilateral breasts with no palpable mass.  Left breast LIQ with post-radiation changes.   Left axillary incision is well-healed.  No clinical lymphadenopathy.  [de-identified] : normal respiratory effort

## 2024-06-30 ENCOUNTER — NON-APPOINTMENT (OUTPATIENT)
Age: 69
End: 2024-06-30

## 2024-07-01 ENCOUNTER — APPOINTMENT (OUTPATIENT)
Dept: HEMATOLOGY ONCOLOGY | Facility: CLINIC | Age: 69
End: 2024-07-01
Payer: MEDICARE

## 2024-07-01 VITALS
WEIGHT: 174.82 LBS | HEART RATE: 77 BPM | RESPIRATION RATE: 16 BRPM | BODY MASS INDEX: 34.14 KG/M2 | DIASTOLIC BLOOD PRESSURE: 85 MMHG | SYSTOLIC BLOOD PRESSURE: 133 MMHG | OXYGEN SATURATION: 97 % | TEMPERATURE: 97.9 F

## 2024-07-01 DIAGNOSIS — C50.912 MALIGNANT NEOPLASM OF UNSPECIFIED SITE OF LEFT FEMALE BREAST: ICD-10-CM

## 2024-07-01 PROCEDURE — 99213 OFFICE O/P EST LOW 20 MIN: CPT

## 2024-07-01 PROCEDURE — G2211 COMPLEX E/M VISIT ADD ON: CPT

## 2024-09-10 ENCOUNTER — APPOINTMENT (OUTPATIENT)
Dept: SURGICAL ONCOLOGY | Facility: CLINIC | Age: 69
End: 2024-09-10
Payer: MEDICARE

## 2024-09-10 VITALS
DIASTOLIC BLOOD PRESSURE: 90 MMHG | OXYGEN SATURATION: 98 % | HEIGHT: 60 IN | WEIGHT: 174 LBS | SYSTOLIC BLOOD PRESSURE: 150 MMHG | BODY MASS INDEX: 34.16 KG/M2 | HEART RATE: 90 BPM

## 2024-09-10 DIAGNOSIS — Z85.3 PERSONAL HISTORY OF MALIGNANT NEOPLASM OF BREAST: ICD-10-CM

## 2024-09-10 DIAGNOSIS — L30.4 ERYTHEMA INTERTRIGO: ICD-10-CM

## 2024-09-10 PROCEDURE — 99212 OFFICE O/P EST SF 10 MIN: CPT

## 2024-09-10 NOTE — HISTORY OF PRESENT ILLNESS
[de-identified] : REAGAN DIAZ is a 68- y/o F with dx'ed w/ Left cT1N0 IDC +/+/- > pT1a N0 s/p L PM, L SLNBx (0/) on 3/31/23. s/p XRT, on adjuvant anastrozole Here for 6-month follow up.   3/31/23 s/p s/p L PM, L SLNBx (0/1) - L 2mm isolated focus of DCIS that is 1 mm from superior margin 23 Met w/ Dr. Rice for initial eval with plan for radiation therapy to the breast. 23 Met w/ Dr. Page for initial eval, given cancer was <5 mm no role for chemotherapy and no role for oncotype. Plan to start adjuvant anastrozole about 2 weeks after completing RT . 2023- 2023 Completed Left breast RT 23 - No new breast complaints. Left post-RT skin changes are improving. She started anastrozole 23.  10/26/23 - No new breast complaints.  Left Dx MG/US BR2. 3/5/24 Since last visit has undergone BL Dx MG/US rated BR 2. No new beast complaints. Tolerating anastrozole w/ some noted increase in axillary sweating, though mild.   No systemic symptoms.  9/10/24 Here for 6-month follow up. Continues on anastrozole with some episodes of hot flashes/ sweating which she states are tolerable. Continued intermittent left breast shooting pains since surgery. No systemic symptoms.   Referred by: Dr Breonna Ch (GYN)  PCP: Dr. Chris Shi   PMHx: Denies  PSHx: Lap CCY +15 yrs ago, Umbilical hernia + 8 yrs ago  Meds: Denies  NKDA Family Hx: No history of Breast or ovarian cancer, prostate Cancer (Father dx 70), Colon Cancer (son- passed at 35)  GYN: , menarche age 12, menopause in early 50s, Age at first pregnancy 20.  Y (6-7 months of each) Bra size:40 D

## 2024-09-10 NOTE — ASSESSMENT
Edward Carbajal Patient Age: 39 year old  MESSAGE:   Where is the pain? LEFT CALF     How long have you had the pain? 2 MONTHS    On a scale of 0 (no pain) to 10 (worst pain ever), what would you rate your current pain level? 6    Do you feel that you are currently experiencing a life threatening emergency? NO  If YES- no appointment made, connect patient to hotline  If NO- make appointment and connect patient to queue    Pt scheduled appt with Dr. Lawson for tomorrow 5/17 at 10:40am    Pt states he does not see any redness to area and it is not warm to the touch.    Pt does not know if it is a work injury but declined WC and is aware of financial responsibility.     Transferred caller to nurse que          Next and Last Visit with Provider and Department  Last visit with this provider: 12/10/2018  Last visit with this department: 12/10/2018    Next visit with this provider: Visit date not found  Next visit with this department: 5/17/2019    WEIGHT AND HEIGHT:   Wt Readings from Last 1 Encounters:   01/02/19 104.3 kg (230 lb)     Ht Readings from Last 1 Encounters:   01/02/19 5' 11\" (1.803 m)     BMI Readings from Last 1 Encounters:   01/02/19 32.08 kg/m²       ALLERGIES:  Nsaids  Current Outpatient Medications   Medication   • fluticasone (ARNUITY ELLIPTA) 100 MCG/ACT inhaler   • fluticasone (FLOVENT HFA) 110 MCG/ACT inhaler   • albuterol 108 (90 Base) MCG/ACT inhaler     No current facility-administered medications for this visit.      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 s/p XRT, on adjuvant anastrozole Here for 6-month follow up.  3/10/2023 [facility] Genetic (full) panel: Negative, VUS - NTHL1    We discussed her clinical breast exam today is unremarkable with no clinical evidence of disease.   I reviewed her most recent breast imaging with no significant findings. Recommend nystatin powder for underbreast irritation, possible fungal rash.   - Radiation oncology (Dr. Rice): Completed RT on 6/12/23 - Medical Oncology (Dr. Page): started anastrozole on 7/1/23.   tolerating well.  - Next imaging: B/L Dx MG/US due February 2025 - RTO for 6-month follow up. She knows to return sooner if any breast imaging abnormalities or if any breast issues/concerns arise.  . directly  ROUTING: Patient's physician/staff        PCP: Ethan Arteaga MD         INS: Payor: [facility] / Plan: [id number] / Product Type: PPO MISC   PATIENT ADDRESS:  [address] [place] [address]

## 2024-09-10 NOTE — ASSESSMENT
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 s/p XRT, on adjuvant anastrozole Here for 6-month follow up.  3/10/2023 [facility] Genetic (full) panel: Negative, VUS - NTHL1    We discussed her clinical breast exam today is unremarkable with no clinical evidence of disease.   I reviewed her most recent breast imaging with no significant findings. Recommend nystatin powder for underbreast irritation, possible fungal rash.   - Radiation oncology (Dr. Rice): Completed RT on 6/12/23 - Medical Oncology (Dr. Page): started anastrozole on 7/1/23.   tolerating well.  - Next imaging: B/L Dx MG/US due February 2025 - RTO for 6-month follow up. She knows to return sooner if any breast imaging abnormalities or if any breast issues/concerns arise.  .

## 2024-09-10 NOTE — PHYSICAL EXAM
[Normal] : supple, no neck mass and thyroid not enlarged [Normal Neck Lymph Nodes] : normal neck lymph nodes  [Normal Supraclavicular Lymph Nodes] : normal supraclavicular lymph nodes [Normal Axillary Lymph Nodes] : normal axillary lymph nodes [Normal] : oriented to person, place and time, with appropriate affect [de-identified] : Left breast periareolar incision is well-healed.  no parenchymal defect.  Bilateral breasts with no palpable mass.  Left breast LIQ with post-radiation changes.   Left axillary incision is well-healed.  No clinical lymphadenopathy.  [de-identified] : normal respiratory effort

## 2024-09-10 NOTE — RESULTS/DATA
[FreeTextEntry1] : Breast Rad/Path Summary:  02/13/2023 L Dx MG/US: BIRADs 4, Indeterminate L 1N8 4 mm suspicious mass - Rec USGBx if non concordant mammographically then rec Stereo bx 02/21/2023 L USGBx: ~ L 1N8 core bx: Invasive ductal carcinoma with prominent micropapillary feat. Moderately differentiated with calcs, DCIS not seen, ER %, SC %, HER2 Negative ( 1+)-> FISH Neg, Ki-67 25%  3/9/2023 Breast MRI: L UOQ bx marker w/ 8 mm associated NME corresp to site of newly dx malignancy, 2.5 cm Sternal lesion, likely a hemangioma - Rec further eval w/ CT Sternum for confirmation 3/23/2023 CT Chest w/o Contrast: Abnormality on Breast MRI corresponds to a sternal abnormality that is unchanged from 12/5/2016, likely hemangioma. 3/24/2023 L DX MG/US: BIRADs 6, Normal L Axillary fat-containing LN, L 1N8 bx- proven 5 mm cancer w/ clip in place  ~3/31/2023 s/p L Chio Loc PM, L SLNBx (0/1) at [facility] ~ pT1a, pN0   Surgical Path: - L Breast 1:00 lumpectomy: Moderately differentiated IDC (2.0 mm), Focus of DCIS 1.0 mm from the superior inked resection margin. It is an isolated focus measuring 2 mm   9/26/23 L Dx MG/US:  BIRADs 2. HD. postsurgical changes as expected.  2/9/24 BL Dx MG/US: BIRADs 2. SFGD. Minimal L skin thickening likely 2/2 post radiation and/or post-surgical change. Stable L 1N8 and 1:00 RA scar sites

## 2024-09-10 NOTE — RESULTS/DATA
[FreeTextEntry1] : Breast Rad/Path Summary:  02/13/2023 L Dx MG/US: BIRADs 4, Indeterminate L 1N8 4 mm suspicious mass - Rec USGBx if non concordant mammographically then rec Stereo bx 02/21/2023 L USGBx: ~ L 1N8 core bx: Invasive ductal carcinoma with prominent micropapillary feat. Moderately differentiated with calcs, DCIS not seen, ER %, PA %, HER2 Negative ( 1+)-> FISH Neg, Ki-67 25%  3/9/2023 Breast MRI: L UOQ bx marker w/ 8 mm associated NME corresp to site of newly dx malignancy, 2.5 cm Sternal lesion, likely a hemangioma - Rec further eval w/ CT Sternum for confirmation 3/23/2023 CT Chest w/o Contrast: Abnormality on Breast MRI corresponds to a sternal abnormality that is unchanged from 12/5/2016, likely hemangioma. 3/24/2023 L DX MG/US: BIRADs 6, Normal L Axillary fat-containing LN, L 1N8 bx- proven 5 mm cancer w/ clip in place  ~3/31/2023 s/p L Chio Loc PM, L SLNBx (0/1) at [facility] ~ pT1a, pN0   Surgical Path: - L Breast 1:00 lumpectomy: Moderately differentiated IDC (2.0 mm), Focus of DCIS 1.0 mm from the superior inked resection margin. It is an isolated focus measuring 2 mm   9/26/23 L Dx MG/US:  BIRADs 2. HD. postsurgical changes as expected.  2/9/24 BL Dx MG/US: BIRADs 2. SFGD. Minimal L skin thickening likely 2/2 post radiation and/or post-surgical change. Stable L 1N8 and 1:00 RA scar sites

## 2024-09-10 NOTE — PHYSICAL EXAM
[Normal] : supple, no neck mass and thyroid not enlarged [Normal Neck Lymph Nodes] : normal neck lymph nodes  [Normal Supraclavicular Lymph Nodes] : normal supraclavicular lymph nodes [Normal Axillary Lymph Nodes] : normal axillary lymph nodes [Normal] : oriented to person, place and time, with appropriate affect [de-identified] : Left breast periareolar incision is well-healed.  no parenchymal defect.  Bilateral breasts with no palpable mass.  Left breast LIQ with post-radiation changes.   Left axillary incision is well-healed.  No clinical lymphadenopathy.  [de-identified] : normal respiratory effort

## 2024-09-10 NOTE — HISTORY OF PRESENT ILLNESS
[de-identified] : REAGAN DIAZ is a 68- y/o F with dx'ed w/ Left cT1N0 IDC +/+/- > pT1a N0 s/p L PM, L SLNBx (0/) on 3/31/23. s/p XRT, on adjuvant anastrozole Here for 6-month follow up.   3/31/23 s/p s/p L PM, L SLNBx (0/1) - L 2mm isolated focus of DCIS that is 1 mm from superior margin 23 Met w/ Dr. Rice for initial eval with plan for radiation therapy to the breast. 23 Met w/ Dr. Page for initial eval, given cancer was <5 mm no role for chemotherapy and no role for oncotype. Plan to start adjuvant anastrozole about 2 weeks after completing RT . 2023- 2023 Completed Left breast RT 23 - No new breast complaints. Left post-RT skin changes are improving. She started anastrozole 23.  10/26/23 - No new breast complaints.  Left Dx MG/US BR2. 3/5/24 Since last visit has undergone BL Dx MG/US rated BR 2. No new beast complaints. Tolerating anastrozole w/ some noted increase in axillary sweating, though mild.   No systemic symptoms.  9/10/24 Here for 6-month follow up. Continues on anastrozole with some episodes of hot flashes/ sweating which she states are tolerable. Continued intermittent left breast shooting pains since surgery. No systemic symptoms.   Referred by: Dr Breonna Ch (GYN)  PCP: Dr. Chris Shi   PMHx: Denies  PSHx: Lap CCY +15 yrs ago, Umbilical hernia + 8 yrs ago  Meds: Denies  NKDA Family Hx: No history of Breast or ovarian cancer, prostate Cancer (Father dx 70), Colon Cancer (son- passed at 35)  GYN: , menarche age 12, menopause in early 50s, Age at first pregnancy 20.  Y (6-7 months of each) Bra size:40 D

## 2024-09-13 PROBLEM — L30.4 INTERTRIGO: Status: ACTIVE | Noted: 2024-09-13

## 2024-09-13 RX ORDER — NYSTATIN 100000 1/G
100000 POWDER TOPICAL
Qty: 1 | Refills: 0 | Status: ACTIVE | COMMUNITY
Start: 2024-09-13 | End: 1900-01-01

## 2025-01-06 ENCOUNTER — OUTPATIENT (OUTPATIENT)
Dept: OUTPATIENT SERVICES | Facility: HOSPITAL | Age: 70
LOS: 1 days | Discharge: HOME | End: 2025-01-06

## 2025-01-06 DIAGNOSIS — Z90.49 ACQUIRED ABSENCE OF OTHER SPECIFIED PARTS OF DIGESTIVE TRACT: Chronic | ICD-10-CM

## 2025-01-06 DIAGNOSIS — N64.89 OTHER SPECIFIED DISORDERS OF BREAST: ICD-10-CM

## 2025-01-07 ENCOUNTER — APPOINTMENT (OUTPATIENT)
Dept: HEMATOLOGY ONCOLOGY | Facility: CLINIC | Age: 70
End: 2025-01-07
Payer: MEDICARE

## 2025-01-07 ENCOUNTER — NON-APPOINTMENT (OUTPATIENT)
Age: 70
End: 2025-01-07

## 2025-01-07 VITALS
OXYGEN SATURATION: 98 % | TEMPERATURE: 97.5 F | HEART RATE: 85 BPM | BODY MASS INDEX: 34.87 KG/M2 | RESPIRATION RATE: 16 BRPM | WEIGHT: 178.55 LBS | SYSTOLIC BLOOD PRESSURE: 125 MMHG | DIASTOLIC BLOOD PRESSURE: 84 MMHG

## 2025-01-07 DIAGNOSIS — Z79.899 OTHER LONG TERM (CURRENT) DRUG THERAPY: ICD-10-CM

## 2025-01-07 DIAGNOSIS — C50.912 MALIGNANT NEOPLASM OF UNSPECIFIED SITE OF LEFT FEMALE BREAST: ICD-10-CM

## 2025-01-07 PROCEDURE — 99214 OFFICE O/P EST MOD 30 MIN: CPT

## 2025-02-10 ENCOUNTER — RESULT REVIEW (OUTPATIENT)
Age: 70
End: 2025-02-10

## 2025-02-10 ENCOUNTER — APPOINTMENT (OUTPATIENT)
Dept: ULTRASOUND IMAGING | Facility: IMAGING CENTER | Age: 70
End: 2025-02-10
Payer: MEDICARE

## 2025-02-10 ENCOUNTER — APPOINTMENT (OUTPATIENT)
Dept: MAMMOGRAPHY | Facility: IMAGING CENTER | Age: 70
End: 2025-02-10
Payer: MEDICARE

## 2025-02-10 ENCOUNTER — OUTPATIENT (OUTPATIENT)
Dept: OUTPATIENT SERVICES | Facility: HOSPITAL | Age: 70
LOS: 1 days | End: 2025-02-10
Payer: MEDICARE

## 2025-02-10 DIAGNOSIS — Z00.00 ENCOUNTER FOR GENERAL ADULT MEDICAL EXAMINATION WITHOUT ABNORMAL FINDINGS: ICD-10-CM

## 2025-02-10 DIAGNOSIS — Z85.3 PERSONAL HISTORY OF MALIGNANT NEOPLASM OF BREAST: ICD-10-CM

## 2025-02-10 PROCEDURE — G0279: CPT | Mod: 26

## 2025-02-10 PROCEDURE — G0279: CPT

## 2025-02-10 PROCEDURE — 76641 ULTRASOUND BREAST COMPLETE: CPT

## 2025-02-10 PROCEDURE — 76641 ULTRASOUND BREAST COMPLETE: CPT | Mod: 26,50

## 2025-02-10 PROCEDURE — 77066 DX MAMMO INCL CAD BI: CPT | Mod: 26

## 2025-02-10 PROCEDURE — 77066 DX MAMMO INCL CAD BI: CPT

## 2025-02-24 ENCOUNTER — OUTPATIENT (OUTPATIENT)
Dept: OUTPATIENT SERVICES | Facility: HOSPITAL | Age: 70
LOS: 1 days | End: 2025-02-24
Payer: MEDICARE

## 2025-02-24 ENCOUNTER — APPOINTMENT (OUTPATIENT)
Dept: ULTRASOUND IMAGING | Facility: IMAGING CENTER | Age: 70
End: 2025-02-24
Payer: MEDICARE

## 2025-02-24 DIAGNOSIS — C50.912 MALIGNANT NEOPLASM OF UNSPECIFIED SITE OF LEFT FEMALE BREAST: ICD-10-CM

## 2025-02-24 DIAGNOSIS — Z90.49 ACQUIRED ABSENCE OF OTHER SPECIFIED PARTS OF DIGESTIVE TRACT: Chronic | ICD-10-CM

## 2025-02-24 PROCEDURE — 76536 US EXAM OF HEAD AND NECK: CPT

## 2025-02-24 PROCEDURE — 76536 US EXAM OF HEAD AND NECK: CPT | Mod: 26

## 2025-03-11 ENCOUNTER — APPOINTMENT (OUTPATIENT)
Dept: SURGICAL ONCOLOGY | Facility: CLINIC | Age: 70
End: 2025-03-11
Payer: MEDICARE

## 2025-03-11 VITALS
HEART RATE: 86 BPM | SYSTOLIC BLOOD PRESSURE: 118 MMHG | OXYGEN SATURATION: 98 % | WEIGHT: 176 LBS | HEIGHT: 60 IN | DIASTOLIC BLOOD PRESSURE: 84 MMHG | BODY MASS INDEX: 34.55 KG/M2

## 2025-03-11 DIAGNOSIS — Z85.3 PERSONAL HISTORY OF MALIGNANT NEOPLASM OF BREAST: ICD-10-CM

## 2025-03-11 DIAGNOSIS — C50.912 MALIGNANT NEOPLASM OF UNSPECIFIED SITE OF LEFT FEMALE BREAST: ICD-10-CM

## 2025-03-11 PROCEDURE — 99212 OFFICE O/P EST SF 10 MIN: CPT

## 2025-04-01 ENCOUNTER — OUTPATIENT (OUTPATIENT)
Dept: OUTPATIENT SERVICES | Facility: HOSPITAL | Age: 70
LOS: 1 days | End: 2025-04-01
Payer: MEDICARE

## 2025-04-01 ENCOUNTER — APPOINTMENT (OUTPATIENT)
Dept: CT IMAGING | Facility: IMAGING CENTER | Age: 70
End: 2025-04-01
Payer: MEDICARE

## 2025-04-01 DIAGNOSIS — Z90.49 ACQUIRED ABSENCE OF OTHER SPECIFIED PARTS OF DIGESTIVE TRACT: Chronic | ICD-10-CM

## 2025-04-01 DIAGNOSIS — C50.912 MALIGNANT NEOPLASM OF UNSPECIFIED SITE OF LEFT FEMALE BREAST: ICD-10-CM

## 2025-04-01 DIAGNOSIS — Z00.8 ENCOUNTER FOR OTHER GENERAL EXAMINATION: ICD-10-CM

## 2025-04-01 PROCEDURE — 70491 CT SOFT TISSUE NECK W/DYE: CPT | Mod: 26

## 2025-04-01 PROCEDURE — 70491 CT SOFT TISSUE NECK W/DYE: CPT

## 2025-04-15 NOTE — H&P PST ADULT - BREASTS DETAILS
04/15/25 0803   Final Note   Assessment Type Final Discharge Note   Anticipated Discharge Disposition Home-Health   Hospital Resources/Appts/Education Provided Appointments scheduled and added to AVS;Post-Acute resouces added to AVS   Post-Acute Status   Post-Acute Authorization Home Health;HME   HME Status Set-up Complete/Auth obtained   Home Health Status Set-up Complete/Auth obtained     Pts nurse Isabella notified that the pt can d/c from CM standpoint   normal shape/no mass/no tenderness/nipples normal/no discharge or discoloration/tenderness L

## 2025-05-12 NOTE — PACU DISCHARGE NOTE - NAUSEA/VOMITING:
Denise Valadez is a 69 y.o. female presents with   Chief Complaint   Patient presents with    Annual Exam        Diagnosis   1. Encounter for screening mammogram for malignant neoplasm of breast       2. Chest pain, unspecified type    Patient reports 1 episode of substernal chest pain which she states developed while she was shopping at [employer].  She states that the discomfort lasted for approximately 3 minutes.  Denies any dyspnea palpitations or other further chest pain since that episode.  Patient had echo 2023 within normal limits      3. Mixed hyperlipidemia    Currently on atorvastatin 40 mg once daily most recent labs controlled   4. Deficiency of other specified B group vitamins    Not on supplementation levels within normal limits   5. Vitamin D deficiency    Most recent level within normal limits on OTC vitamin D3 5000 units   6. History of abdominoplasty    Patient had repair of her diastases recti along with hernia repair December 2020 for which she states she is doing very well   7. Gastroesophageal reflux disease with esophagitis without hemorrhage    Stable on omeprazole 20 mg once daily     /74 (BP Site: Left Upper Arm, Patient Position: Sitting, BP Cuff Size: Medium Adult)   Pulse 70   Temp 97.7 °F (36.5 °C) (Temporal)   Resp 18   Ht 1.549 m (5' 1\")   Wt 50.8 kg (112 lb)   SpO2 98%   BMI 21.16 kg/m²   Subjective:     Past Medical History:   Diagnosis Date    GERD (gastroesophageal reflux disease) 2018    Hypothyroidism 2016    Liver disease     Hepatic Steatosis    Thyroid disease      Past Surgical History:   Procedure Laterality Date    COLONOSCOPY  4-24    HYSTERECTOMY, VAGINAL  12/1998    Still have ovaries    TONSILLECTOMY  1971    TOTAL COLECTOMY      UMBILICAL HERNIA REPAIR      Ventral Hernia repair with Abdominal Reconstruction    UPPER GASTROINTESTINAL ENDOSCOPY      Difficulty swallowing     Social History     Socioeconomic History    Marital status:   None

## 2025-06-28 ENCOUNTER — OUTPATIENT (OUTPATIENT)
Dept: OUTPATIENT SERVICES | Facility: HOSPITAL | Age: 70
LOS: 1 days | Discharge: HOME | End: 2025-06-28

## 2025-06-28 DIAGNOSIS — Z90.49 ACQUIRED ABSENCE OF OTHER SPECIFIED PARTS OF DIGESTIVE TRACT: Chronic | ICD-10-CM

## 2025-06-28 DIAGNOSIS — N64.89 OTHER SPECIFIED DISORDERS OF BREAST: ICD-10-CM

## 2025-07-01 ENCOUNTER — APPOINTMENT (OUTPATIENT)
Dept: HEMATOLOGY ONCOLOGY | Facility: CLINIC | Age: 70
End: 2025-07-01
Payer: MEDICARE

## 2025-07-01 VITALS
TEMPERATURE: 97.9 F | RESPIRATION RATE: 16 BRPM | HEART RATE: 91 BPM | WEIGHT: 178.57 LBS | OXYGEN SATURATION: 98 % | SYSTOLIC BLOOD PRESSURE: 128 MMHG | DIASTOLIC BLOOD PRESSURE: 88 MMHG | BODY MASS INDEX: 34.87 KG/M2

## 2025-07-01 PROCEDURE — 99213 OFFICE O/P EST LOW 20 MIN: CPT

## 2025-07-01 PROCEDURE — G2211 COMPLEX E/M VISIT ADD ON: CPT

## 2025-07-29 ENCOUNTER — APPOINTMENT (OUTPATIENT)
Dept: RADIOLOGY | Facility: IMAGING CENTER | Age: 70
End: 2025-07-29
Payer: MEDICARE

## 2025-07-29 ENCOUNTER — OUTPATIENT (OUTPATIENT)
Dept: OUTPATIENT SERVICES | Facility: HOSPITAL | Age: 70
LOS: 1 days | End: 2025-07-29
Payer: MEDICARE

## 2025-07-29 DIAGNOSIS — M85.80 OTHER SPECIFIED DISORDERS OF BONE DENSITY AND STRUCTURE, UNSPECIFIED SITE: ICD-10-CM

## 2025-07-29 DIAGNOSIS — Z90.49 ACQUIRED ABSENCE OF OTHER SPECIFIED PARTS OF DIGESTIVE TRACT: Chronic | ICD-10-CM

## 2025-07-29 PROCEDURE — 77080 DXA BONE DENSITY AXIAL: CPT

## 2025-07-29 PROCEDURE — 77080 DXA BONE DENSITY AXIAL: CPT | Mod: 26

## 2025-09-09 ENCOUNTER — NON-APPOINTMENT (OUTPATIENT)
Age: 70
End: 2025-09-09

## 2025-09-09 ENCOUNTER — APPOINTMENT (OUTPATIENT)
Dept: SURGICAL ONCOLOGY | Facility: CLINIC | Age: 70
End: 2025-09-09
Payer: MEDICARE

## 2025-09-09 VITALS
WEIGHT: 175 LBS | DIASTOLIC BLOOD PRESSURE: 93 MMHG | HEART RATE: 83 BPM | OXYGEN SATURATION: 97 % | BODY MASS INDEX: 34.36 KG/M2 | SYSTOLIC BLOOD PRESSURE: 142 MMHG | HEIGHT: 60 IN

## 2025-09-09 DIAGNOSIS — Z85.3 PERSONAL HISTORY OF MALIGNANT NEOPLASM OF BREAST: ICD-10-CM

## 2025-09-09 PROCEDURE — 99213 OFFICE O/P EST LOW 20 MIN: CPT

## (undated) DEVICE — SOL IRR POUR NS 0.9% 500ML

## (undated) DEVICE — SUT SILK 2-0 30" SH

## (undated) DEVICE — POSITIONER FOAM EGG CRATE ULNAR 2PCS (PINK)

## (undated) DEVICE — ELCTR BOVIE TIP BLADE INSULATED 4" EDGE

## (undated) DEVICE — VENODYNE/SCD SLEEVE CALF MEDIUM

## (undated) DEVICE — DRSG MAMMARY SUPPORT LG SIZE 4

## (undated) DEVICE — NDL HYPO SAFE 25G X 1" (ORANGE)

## (undated) DEVICE — DRSG MAMMARY SUPPORT XL SIZE 5

## (undated) DEVICE — DRAPE LAPAROTOMY TRANSVERSE

## (undated) DEVICE — PACK MINOR NO DRAPE

## (undated) DEVICE — DRAPE 3/4 SHEET 52X76"

## (undated) DEVICE — SOL IRR POUR H2O 500ML

## (undated) DEVICE — DRSG MAMMARY SUPPORT SM SIZE 1

## (undated) DEVICE — SAVI SCOUT HANDPIECE

## (undated) DEVICE — ELCTR GROUNDING PAD ADULT COVIDIEN

## (undated) DEVICE — DRAPE TOWEL BLUE 17" X 24"

## (undated) DEVICE — DRSG TEGADERM 4X4.75"

## (undated) DEVICE — GLV 6.5 PROTEXIS W HYDROGEL

## (undated) DEVICE — RADIOGRAPHY DVC SPEC TRANSPEC

## (undated) DEVICE — DRSG STERISTRIPS 0.5 X 4"

## (undated) DEVICE — LAP PAD W RING 18 X 18"

## (undated) DEVICE — DRSG MAMMARY SUPPORT MED SIZE 3

## (undated) DEVICE — SUT VICRYL 3-0 27" SH UNDYED

## (undated) DEVICE — WARMING BLANKET LOWER ADULT

## (undated) DEVICE — ELCTR ROCKER SWITCH PENCIL BLUE 10FT

## (undated) DEVICE — GOWN LG

## (undated) DEVICE — POSITIONER PATIENT SAFETY STRAP 3X60"

## (undated) DEVICE — SUT MONOCRYL 4-0 27" PS-2 UNDYED

## (undated) DEVICE — DRSG DERMABOND 0.7ML

## (undated) DEVICE — SUT VICRYL 4-0 27" SH UNDYED

## (undated) DEVICE — DRSG MAMMARY SUPPORT MED SIZE 2